# Patient Record
Sex: FEMALE | Race: WHITE | NOT HISPANIC OR LATINO | Employment: OTHER | URBAN - METROPOLITAN AREA
[De-identification: names, ages, dates, MRNs, and addresses within clinical notes are randomized per-mention and may not be internally consistent; named-entity substitution may affect disease eponyms.]

---

## 2017-09-19 ENCOUNTER — ALLSCRIPTS OFFICE VISIT (OUTPATIENT)
Dept: OTHER | Facility: OTHER | Age: 72
End: 2017-09-19

## 2017-09-19 DIAGNOSIS — E03.9 HYPOTHYROIDISM: ICD-10-CM

## 2017-09-19 DIAGNOSIS — E78.5 HYPERLIPIDEMIA: ICD-10-CM

## 2017-09-19 DIAGNOSIS — Z12.31 ENCOUNTER FOR SCREENING MAMMOGRAM FOR MALIGNANT NEOPLASM OF BREAST: ICD-10-CM

## 2017-09-19 DIAGNOSIS — F41.8 OTHER SPECIFIED ANXIETY DISORDERS: ICD-10-CM

## 2017-09-19 DIAGNOSIS — Z78.0 ASYMPTOMATIC MENOPAUSAL STATE: ICD-10-CM

## 2017-10-18 ENCOUNTER — ALLSCRIPTS OFFICE VISIT (OUTPATIENT)
Dept: OTHER | Facility: OTHER | Age: 72
End: 2017-10-18

## 2017-10-21 NOTE — PROGRESS NOTES
Assessment  1  Carpal tunnel syndrome of right wrist (354 0) (G56 01)   2  Trigger ring finger of right hand (727 03) (M65 341)    Plan  Trigger ring finger of right hand    · Follow-up visit in 3 weeks Evaluation and Treatment  Follow-up  Status: Hold For -  Scheduling  Requested for: 61WNC4588   · Continue with our present treatment plan ; Status:Complete;   Done: 75FSG8016    Discussion/Summary    Patient with right hand paresthesias, previously diagnosed with carpal tunnel syndrome  It was explained that since the patient has been treated previously, we would need the records from this to see what all has been performed in the results of the EMG  Regarding the finger, patient diagnosed with a right ring trigger finger  The anatomy and physiology of trigger finger was discussed with the patient today in the office  Edema and increased contact pressure within the flexor tendons at the A1 pulley can cause pain, crepitation, and limitation of function  Treatment options include resting MP blocking splints to decrease edema, oral anti-inflammatory medications, home or formal therapy exercises, up to 2 steroid injections within the tendon sheath, or surgical release  While majority of patients do respond to conservative treatment, up to 20% may require surgical release  this time, patient would like to wait for treatment of the trigger finger until we are able to fully valve evaluate her EMG  It was explained since her symptoms are not completely typical of carpal tunnel, we do need to verify that there is nothing further going on causing her symptoms  Chief Complaint  1  Wrist Pain    History of Present Illness  HPI: Patient is a pleasant 79-year-old female who presents here today with complaints of right hand paresthesias as well as ring finger locking and clicking  Patient states she has had previous treatment for carpal tunnel syndrome at an outside facility   States she has received multiple injections into the carpal tunnel, all of which have helped but do wear off  Believe she had a previous EMG approximately a year ago  Has tried carpal tunnel splints with partial relief  No treatment for her finger  Review of Systems    Constitutional: No fever, no chills, feels well, no tiredness, no recent weight gain or loss  Eyes: No complaints of eyesight problems, no red eyes  ENT: no loss of hearing, no nosebleeds, no sore throat  Cardiovascular: No complaints of chest pain, no palpitations, no leg claudication or lower extremity edema  Respiratory: no compliants of shortness of breath, no wheezing, no cough  Gastrointestinal: no complaints of abdominal pain, no constipation, no nausea or diarrhea, no vomiting, no bloody stools  Genitourinary: no complaints of dysuria, no incontinence  Musculoskeletal: as noted in HPI  Integumentary: no complaints of skin rash or lesion, no itching or dry skin, no skin wounds  Neurological: as noted in HPI  Endocrine: No complaints of muscle weakness, no feelings of weakness, no frequent urination, no excessive thirst    Psychiatric: No suicidal thoughts, no anxiety, no feelings of depression  Active Problems  1  Carpal tunnel syndrome of right wrist (354 0) (G56 01)   2  Chronic neck pain (723 1,338 29) (M54 2,G89 29)   3  Degenerative cervical spinal stenosis (723 0) (M48 02)   4  Depression with anxiety (300 4) (F41 8)   5  Encounter for screening mammogram for breast cancer (V76 12) (Z12 31)   6  Hyperlipidemia (272 4) (E78 5)   7  Hypothyroidism (244 9) (E03 9)   8  Insomnia (780 52) (G47 00)   9  Menopause (627 2) (Z78 0)   10  Need for influenza vaccination (V04 81) (Z23)   11  Obesity (BMI 30 0-34 9) (278 00) (E66 9)   12  Shoulder pain, right (719 41) (M25 511)    Past Medical History    The active problems and past medical history were reviewed and updated today        Surgical History   · History of Cholecystectomy   · History of Gastrectomy Sleeve   · History of Total Knee Replacement Right    The surgical history was reviewed and updated today  Family History   · Family history of lung cancer (V16 1) (Z80 1)   · Family history of depression (V17 0) (Z81 8)   · Family history of Alcohol abuse   · Family history of Patient denies drug use   · Family history of lung cancer (V16 1) (Z80 1)   · Family history of malignant neoplasm of esophagus (V16 0) (Z80 0)    The family history was reviewed and updated today  Social History   · Never smoker   · Occasional alcohol use  The social history was reviewed and updated today  The social history was reviewed and is unchanged  Current Meds   1  Acyclovir 400 MG Oral Tablet; TAKE 1 TABLET DAILY PRN FOR HERPES OUTBREAK; Therapy: 51NVY3226 to Recorded   2  Atorvastatin Calcium 20 MG Oral Tablet; Take 1 tablet daily; Therapy: 42Uum8670 to (Evaluate:90Nsp3418); Last Rx:83Piv9484 Ordered   3  B-12 500 MCG Oral Tablet; TAKE 1 TABLET DAILY; Therapy: 55Qpl3744 to (Evaluate:19Oct2017) Recorded   4  Biotin 5000 MCG Oral Capsule; take  1  caps  daily; Therapy: 00Zyh1279 to Recorded   5  Levothyroxine Sodium 88 MCG Oral Tablet; take 1 tablet by mouth daily; Therapy: 80Rpr3812 to (Evaluate:04Jwq6160); Last Rx:84Edy3804 Ordered   6  Multi-Vitamin Oral Tablet; take 1 tab daily; Therapy: 64Dvs4626 to Recorded   7  TraMADol HCl - 50 MG Oral Tablet; TAKE 1 TABLET BID PRN FOR PAIN;   Therapy: 38Isp7430 to (Last Rx:21Jwe8865) Ordered   8  TraZODone HCl - 50 MG Oral Tablet; take 2 tablets at bedtime; Therapy: 69Hbp5507 to (Evaluate:18Nov2017)  Requested for: 51Hcz3159; Last   Rx:36Qrk3603 Ordered   9  Venlafaxine HCl - 75 MG Oral Tablet; TAKE 2 TABLETS TWICE DAILY; Therapy: 62Ypw1331 to (Evaluate:74Hrv2766)  Requested for: 33CGF1200; Last   Rx:22Fax9586 Ordered   10  Vitamin D3 5000 UNIT Oral Tablet; Take 1 tab  Daily; Therapy: 93Jek2400 to Recorded    The medication list was reviewed and updated today  Allergies  1  Adhesive Tape TAPE   2  Codeine Derivatives   3  Lisinopril TABS   4  Paxil TABS    Vitals  Signs   Heart Rate: 88  Systolic: 317  Diastolic: 80  Height: 5 ft 5 5 in  Weight: 196 lb 6 oz  BMI Calculated: 32 18  BSA Calculated: 1 97    Physical Exam      General: No acute distress, age-appropriate  Neck: Supple, trachea midline  HEENT: Normocephalic atraumatic, mucous membranes are moist, sclera are nonicteric  Cardiovascular: No discernable arrhythmia  Respiratory: Breathing is even and unlabored, no stridor or audible wheezing  Psychiatric: Awake alert and oriented x3, normal mood and affect  Abdomen: Without rebound or guarding  Right Basic: Tinnel's at Carpal Tunnel, CMC Joint Pain and Median Weakness, but No Tinel's at Cubital Tunnel, No DeQuervian's and No Ulnar Weakness (Patient with positive compression test at the level of carpal tunnel  Patient with tenderness to palpation along the A1 pulley of the ring finger  Crepitation is felt with flexion extension of the finger)   Cervical Spine: (The cervical spine has adequate range of motion without any evidence of a Spurling's sign  Patient describes the right hand feeling weird with motion of the spine as well as with tension testing  5/5 strength with shoulder abduction, elbow flexion and extension, along with wrist flexion and extension)       Future Appointments    Date/Time Provider Specialty Site   03/19/2018 04:30 PM JAYLENE Collins  [facility]   10/30/2017 01:15 PM JAYLENE Rosales   Orthopedic Surgery  [address] [facility]     Signatures   Electronically signed by : Shandra De Luna [facility]; Oct 19 2017  8:33AM EST                       (Author)    Electronically signed by : JAYLENE Espinoza ; Oct 20 2017  2:48PM EST

## 2017-10-30 ENCOUNTER — GENERIC CONVERSION - ENCOUNTER (OUTPATIENT)
Dept: OTHER | Facility: OTHER | Age: 72
End: 2017-10-30

## 2018-01-02 ENCOUNTER — HOSPITAL ENCOUNTER (OUTPATIENT)
Facility: HOSPITAL | Age: 73
Setting detail: OUTPATIENT SURGERY
Discharge: HOME | End: 2018-01-02
Attending: ORTHOPAEDIC SURGERY | Admitting: ORTHOPAEDIC SURGERY
Payer: COMMERCIAL

## 2018-01-02 VITALS
HEART RATE: 79 BPM | BODY MASS INDEX: 29.82 KG/M2 | SYSTOLIC BLOOD PRESSURE: 107 MMHG | TEMPERATURE: 98.1 F | RESPIRATION RATE: 16 BRPM | WEIGHT: 190 LBS | HEIGHT: 67 IN | OXYGEN SATURATION: 98 % | DIASTOLIC BLOOD PRESSURE: 56 MMHG

## 2018-01-02 PROBLEM — G56.01 CARPAL TUNNEL SYNDROME ON RIGHT: Status: ACTIVE | Noted: 2018-01-02

## 2018-01-02 PROBLEM — M65.341 TRIGGER RING FINGER OF RIGHT HAND: Status: ACTIVE | Noted: 2018-01-02

## 2018-01-02 RX ORDER — HYDROCODONE BITARTRATE AND ACETAMINOPHEN 5; 325 MG/1; MG/1
1 TABLET ORAL EVERY 6 HOURS PRN
Qty: 10 TABLET | Refills: 0 | Status: SHIPPED | OUTPATIENT
Start: 2018-01-02 | End: 2018-03-19 | Stop reason: ALTCHOICE

## 2018-01-02 RX ORDER — MAGNESIUM HYDROXIDE 1200 MG/15ML
LIQUID ORAL AS NEEDED
Status: DISCONTINUED | OUTPATIENT
Start: 2018-01-02 | End: 2018-01-02 | Stop reason: HOSPADM

## 2018-01-02 RX ADMIN — LIDOCAINE HYDROCHLORIDE,EPINEPHRINE BITARTRATE: 10; .01 INJECTION, SOLUTION INFILTRATION; PERINEURAL at 08:20

## 2018-01-02 NOTE — H&P
H&P Exam - Orthopedics   Uche Jara 67 y o  female MRN: [id number]  Unit/Bed#: [facility] 02    Assessment/Plan   Assessment:  R CTS and R ring trigger finger    Plan:  R ECTR and R ring TFR to be performed today under local anesthesia  History of Present Illness   HPI:  Uche Jara is a 67 y o  y o  female who presents with right hand paresthesias  Previous steroid injection into the carpal tunnel resulted in a few months relief  + nocturnal symptoms  Also with catching/locking of right ring finger  Historical Information  Review Of Systems:   · Skin: Normal  · Neuro: See HPI  · Musculoskeletal: See HPI  · 14 point review of systems negative except as stated above     Past Medical History:   History reviewed  No pertinent past medical history  Past Surgical History:   History reviewed  No pertinent surgical history  Family History:  Family history reviewed and non-contributory  History reviewed  No pertinent family history  Social History:  Social History     Social History    Marital status: /Civil Union     Spouse name: N/A    Number of children: N/A    Years of education: N/A     Social History Main Topics    Smoking status: Never Smoker    Smokeless tobacco: Never Used    Alcohol use Yes      Comment: moderately    Drug use: No    Sexual activity: Not Asked     Other Topics Concern    None     Social History Narrative    None       Allergies: Allergies   Allergen Reactions    Codeine Nausea Only    Other Rash     Adhesive tape           Labs:  No results found for: HCT, HGB, PT, INR, WBC, ESR, CRP    Meds:    Current Facility-Administered Medications:     lidocaine-epinephrine (XYLOCAINE/EPINEPHRINE) 1 %-1:100,000 20 mL, sodium bicarbonate 2 mEq infiltration, , Infiltration, Once, Medina Zamudio MD    Blood Culture:   No results found for: BLOODCX    Wound Culture:   No results found for: WOUNDCULT    Ins and Outs:  No intake/output data recorded              Physical Exam  /60   Pulse 83   Temp 98 4 °F (36 9 °C) (Tympanic Core)   Resp 20   Ht 5' 6 5" (1 689 m)   Wt 86 2 kg (190 lb)   SpO2 98%   BMI 30 21 kg/m²   Gen: Alert and oriented to person, place, time  HEENT: EOMI, eyes clear, moist mucus membranes, hearing intact  Respiratory: Bilateral chest rise   No audible wheezing found  Cardiovascular: Regular Rate and Rhythm  Abdomen: soft nontender/nondistended  Ortho Exam: +A1 pulley nodule of right ring finger with clicking/catching  Neuro Exam: + Tinels, + compression testing, + APB weakness    Lab Results: EMG shows b/l moderate carpal tunnel syndrome

## 2018-01-02 NOTE — OP NOTE
OPERATIVE REPORT  PATIENT NAME: Maurice Heredia  :  1945  MRN: [id number]  Pt Location: [facility]    SURGERY DATE: 18    Surgeon(s) and Role:     * Igor Dumas MD - Primary     * Liliam Lai MD - Assisting    Pre-Op Diagnosis:  Trigger ring finger of right hand [M65 341]  Carpal tunnel syndrome of right wrist [G56 01]    Post-Op Diagnosis Codes:     * Trigger ring finger of right hand [M65 341]     * Carpal tunnel syndrome of right wrist [G56 01]    Procedure(s):  RELEASE CARPAL TUNNEL ENDOSCOPIC (Right)  RING TRIGGER FINGER RELEASE (Right)    Specimen(s):  * No order type specified *    Estimated Blood Loss:   Minimal      Anesthesia Type:   Local    Operative Indications: The patient has a history of Carpal Tunnel Syndrome and Trigger Finger that was recalcitrant to conservative management  The decision was made to bring the patient to the operating room for Endoscopic Carpal Tunnel Release and Trigger Finger Release  Risks of the procedure were explained which include, but are not limited to bleeding; infection; damage to nerves, arteries,veins, tendons; scar; pain; need for reoperation; failure to give desired result; and risks of anaesthesia  All questions were answered to satisfaction and they were willing to proceed  Operative Findings:  Right ring trigger finger and carpal tunnel    Complications:   None    Procedure and Technique:  After the patient, site, and procedure were identified, the patient was brought into the operating room in a supine position  Local anaesthesia was adminstered in the preoperative holding area  A tourniquet was not used  The  right upper extremity was then prepped and drapped in a normal, sterile, orthopedic fashion  After reconfirmation of the patient, site, and surgical procedure, which was agreed upon by the entire surgical team, attention was turned to the right wrist   The sites of the proximal and distal incisions were marked    The susan of the proximal incision was placed horizontally at the midline of the wrist   The distal incision susan was longitudinal extending distally from the point of intersection of the line between the long finger and ring finger and the line along the distal border of the fully abducted thumb  The proximal incision was performed  Subcutaneous tissues were dissected  Then the transverse volar antebrachial fascia was perforated with a scalpel  The edges of the skin incision where retracted and the forearm fascia was incised for approximately 1 5 cm proximally with care taken to identify and protect the median nerve  Retractors were used to inspect the transverse carpal ligament distally  A curved Randhawa dissector was used to glide under the transverse carpal ligament and superficial to the median nerve with confirmation via the washboard feeling  Then the curved Randhawa was pushed into the palm toward the distal incision site  When the location of the distal skin susan was adequate, the distal incision was made  Then with retraction of the skin, further dissection and perforation of the palmar fascia was performed with the use of tenotomy scissors  The curved Randhawa was guided from proximal to distal out the distal incisions without any twisting to allow for dilation of the tract  The curved Randhawa was removed, and the cannula for the camera was inserted along the same tract, making sure to keep the alignment post on the cannula perpendicular to the plane of the hand without twisting  Then while keeping the wrist in extension, and holding the cannula of the camera in place, the wrist was placed on the hyperextension board  The scope was inserted distally, and a cotton-tip applicator was used proximally to clean the tract as well as the scope  A curved cutting knife was introduced from proximal to distal while keeping visualization with the use of the camera    Without twisting of the canula, the knife was used to cut the transverse carpal ligament completely, making sure there were no remnant fibers  Then after this was accomplished, the hand was removed from the extension block  Three maneuvers were used to confirm the full release of the transverse carpal ligament  First, the ease of twisting the trocar of the camera confirmed the release of the ligament  Second, the curved Randhawa was introduced to make sure there were no remnant fibers that could be felt palmarly  Third, the scope was introduced again to visualize that the whole ligament was released proximally to distally  Additional confirmation of full release included retraction and inspection in the distal and proximal incisions to make sure there were no remnant fibers distally or proximally respectively  After the patient, site, and procedure were once again identified, attention was turned to the right ring finger  An incision was made over the flexor tendon sheath at the level of the A1 pulley  Dissection was carried out in-line with the flexor tendon sheath and the radial and ulnar digital artery and nerve were protected  The A1 pulley was identified at the base of the incision  Under direct visualization, the A1 pulley was divided along the midline in its entirety with care taken to avoid injury to the underlying tendon  The tendons were examined to ensure that no further catching, popping, clicking or locking occurred with motion of the finger  At the completion of the procedure, hemostasis was obtained with cautery and direct pressure  The wounds were copiously irrigated with sterile solution  The wounds were closed with Prolene  Sterile dressings were applied, including Xeroform, gauze, tweeners, webril, ACE  Please note, all sponge, needle, and instrument counts were correct prior to closure  Loupe magnification was utilized  The patient tolerated the procedure well       I was present for the entire procedure    Patient Disposition:  APU and hemodynamically stable    SIGNATURE: Sandra Zambrano MD  DATE: 01/02/18  TIME: 9:11 AM

## 2018-01-13 NOTE — CONSULTS
Chief Complaint    1  Wrist Pain    History of Present Illness  HPI: Patient is a pleasant 72-year-old female who presents here today with complaints of right hand paresthesias as well as ring finger locking and clicking  Patient states she has had previous treatment for carpal tunnel syndrome at an outside facility  States she has received multiple injections into the carpal tunnel, all of which have helped but do wear off  Believe she had a previous EMG approximately a year ago  Has tried carpal tunnel splints with partial relief  No treatment for her finger  Review of Systems  Focused-Female Orthopedics:   Constitutional: No fever, no chills, feels well, no tiredness, no recent weight gain or loss  Eyes: No complaints of eyesight problems, no red eyes  ENT: no loss of hearing, no nosebleeds, no sore throat  Cardiovascular: No complaints of chest pain, no palpitations, no leg claudication or lower extremity edema  Respiratory: no compliants of shortness of breath, no wheezing, no cough  Gastrointestinal: no complaints of abdominal pain, no constipation, no nausea or diarrhea, no vomiting, no bloody stools  Genitourinary: no complaints of dysuria, no incontinence  Musculoskeletal: as noted in HPI  Integumentary: no complaints of skin rash or lesion, no itching or dry skin, no skin wounds  Neurological: as noted in HPI  Endocrine: No complaints of muscle weakness, no feelings of weakness, no frequent urination, no excessive thirst    Psychiatric: No suicidal thoughts, no anxiety, no feelings of depression  Active Problems    1  Carpal tunnel syndrome of right wrist (354 0) (G56 01)   2  Chronic neck pain (723 1,338 29) (M54 2,G89 29)   3  Degenerative cervical spinal stenosis (723 0) (M48 02)   4  Depression with anxiety (300 4) (F41 8)   5  Encounter for screening mammogram for breast cancer (V76 12) (Z12 31)   6  Hyperlipidemia (272 4) (E78 5)   7   Hypothyroidism (244 9) (E03 9) 8  Insomnia (780 52) (G47 00)   9  Menopause (627 2) (Z78 0)   10  Need for influenza vaccination (V04 81) (Z23)   11  Obesity (BMI 30 0-34 9) (278 00) (E66 9)   12  Shoulder pain, right (719 41) (M25 511)    Past Medical History  Active Problems And Past Medical History Reviewed: The active problems and past medical history were reviewed and updated today  Surgical History    1  History of Cholecystectomy   2  History of Gastrectomy Sleeve   3  History of Total Knee Replacement Right  Surgical History Reviewed: The surgical history was reviewed and updated today  Family History    1  Family history of lung cancer (V16 1) (Z80 1)    2  Family history of depression (V17 0) (Z81 8)    3  Family history of Alcohol abuse   4  Family history of Patient denies drug use    5  Family history of lung cancer (V16 1) (Z80 1)   6  Family history of malignant neoplasm of esophagus (V16 0) (Z80 0)  Family History Reviewed: The family history was reviewed and updated today  Social History    · Never smoker   · Occasional alcohol use  Social History Reviewed: The social history was reviewed and updated today  The social history was reviewed and is unchanged  Current Meds   1  Acyclovir 400 MG Oral Tablet; TAKE 1 TABLET DAILY PRN FOR HERPES OUTBREAK; Therapy: 95PAR7519 to Recorded   2  Atorvastatin Calcium 20 MG Oral Tablet; Take 1 tablet daily; Therapy: 92Ulx1568 to (Evaluate:71Mcs3909); Last Rx:03Rza8355 Ordered   3  B-12 500 MCG Oral Tablet; TAKE 1 TABLET DAILY; Therapy: 62Rtg2101 to (Evaluate:05Cbi4048) Recorded   4  Biotin 5000 MCG Oral Capsule; take  1  caps  daily; Therapy: 32Kyq5301 to Recorded   5  Levothyroxine Sodium 88 MCG Oral Tablet; take 1 tablet by mouth daily; Therapy: 92Jvq0468 to (Evaluate:12Xbc7750); Last Rx:32Wtz5439 Ordered   6  Multi-Vitamin Oral Tablet; take 1 tab daily; Therapy: 07Duw8418 to Recorded   7   TraMADol HCl - 50 MG Oral Tablet; TAKE 1 TABLET BID PRN FOR PAIN;   Therapy: 79Dqd3462 to (Last Rx:19Sep2017) Ordered   8  TraZODone HCl - 50 MG Oral Tablet; take 2 tablets at bedtime; Therapy: 20Tci8122 to (Evaluate:00Ajq4330)  Requested for: 45Zvy3215; Last   Rx:03Ryy0333 Ordered   9  Venlafaxine HCl - 75 MG Oral Tablet; TAKE 2 TABLETS TWICE DAILY; Therapy: 35Ffw0064 to (Evaluate:94Mlc4431)  Requested for: 97LEQ7856; Last   Rx:84Vyi7254 Ordered   10  Vitamin D3 5000 UNIT Oral Tablet; Take 1 tab  Daily; Therapy: 64Nvn7952 to Recorded  Medication List Reviewed: The medication list was reviewed and updated today  Allergies    1  Adhesive Tape TAPE   2  Codeine Derivatives   3  Lisinopril TABS   4  Paxil TABS    Vitals  Signs   Recorded: 68VFG1459 01:11PM   Heart Rate: 88  Systolic: 308  Diastolic: 80  Height: 5 ft 5 5 in  Weight: 196 lb 6 oz  BMI Calculated: 32 18  BSA Calculated: 1 97    Physical Exam      General: No acute distress, age-appropriate  Neck: Supple, trachea midline  HEENT: Normocephalic atraumatic, mucous membranes are moist, sclera are nonicteric  Cardiovascular: No discernable arrhythmia  Respiratory: Breathing is even and unlabored, no stridor or audible wheezing  Psychiatric: Awake alert and oriented x3, normal mood and affect  Abdomen: Without rebound or guarding  Right Basic: Tinnel's at Carpal Tunnel, CMC Joint Pain and Median Weakness, but No Tinel's at Cubital Tunnel, No DeQuervian's and No Ulnar Weakness (Patient with positive compression test at the level of carpal tunnel  Patient with tenderness to palpation along the A1 pulley of the ring finger  Crepitation is felt with flexion extension of the finger)   Cervical Spine: (The cervical spine has adequate range of motion without any evidence of a Spurling's sign  Patient describes the right hand "feeling weird" with motion of the spine as well as with tension testing   5/5 strength with shoulder abduction, elbow flexion and extension, along with wrist flexion and extension)       Assessment    1  Carpal tunnel syndrome of right wrist (354 0) (G56 01)   2  Trigger ring finger of right hand (727 03) (M65 341)    Plan  Trigger ring finger of right hand    1  Follow-up visit in 3 weeks Evaluation and Treatment  Follow-up  Status: Hold For -   Scheduling  Requested for: 39ZIB6184   2  Continue with our present treatment plan ; Status:Complete;   Done: 56FEP3491    Discussion/Summary  Discussion Summary:   Patient with right hand paresthesias, previously diagnosed with carpal tunnel syndrome  It was explained that since the patient has been treated previously, we would need the records from this to see what all has been performed in the results of the EMG  Regarding the finger, patient diagnosed with a right ring trigger finger  The anatomy and physiology of trigger finger was discussed with the patient today in the office  Edema and increased contact pressure within the flexor tendons at the A1 pulley can cause pain, crepitation, and limitation of function  Treatment options include resting MP blocking splints to decrease edema, oral anti-inflammatory medications, home or formal therapy exercises, up to 2 steroid injections within the tendon sheath, or surgical release  While majority of patients do respond to conservative treatment, up to 20% may require surgical release  At this time, patient would like to wait for treatment of the trigger finger until we are able to fully valve evaluate her EMG  It was explained since her symptoms are not completely typical of carpal tunnel, we do need to verify that there is nothing further going on causing her symptoms        Future Appointments    Signatures   Electronically signed by : Gui Schulz, [facility]; Oct 19 2017  8:33AM EST                       (Author)    Electronically signed by : JAYLENE Nguyen ; Oct 20 2017  2:48PM EST

## 2018-01-14 VITALS
SYSTOLIC BLOOD PRESSURE: 162 MMHG | HEART RATE: 88 BPM | BODY MASS INDEX: 31.56 KG/M2 | HEIGHT: 66 IN | WEIGHT: 196.38 LBS | DIASTOLIC BLOOD PRESSURE: 80 MMHG

## 2018-01-15 VITALS
DIASTOLIC BLOOD PRESSURE: 62 MMHG | TEMPERATURE: 97.8 F | HEART RATE: 72 BPM | SYSTOLIC BLOOD PRESSURE: 118 MMHG | HEIGHT: 66 IN | RESPIRATION RATE: 16 BRPM | WEIGHT: 203.4 LBS | BODY MASS INDEX: 32.69 KG/M2

## 2018-01-15 NOTE — CONSULTS
Chief Complaint    1  Wrist Pain    History of Present Illness  HPI: Patient is a pleasant 70-year-old female who presents here today with complaints of right hand paresthesias as well as ring finger locking and clicking  Patient states she has had previous treatment for carpal tunnel syndrome at an outside facility  States she has received multiple injections into the carpal tunnel, all of which have helped but do wear off  Believe she had a previous EMG approximately a year ago  Has tried carpal tunnel splints with partial relief  No treatment for her finger  Review of Systems    Constitutional: No fever, no chills, feels well, no tiredness, no recent weight gain or loss  Eyes: No complaints of eyesight problems, no red eyes  ENT: no loss of hearing, no nosebleeds, no sore throat  Cardiovascular: No complaints of chest pain, no palpitations, no leg claudication or lower extremity edema  Respiratory: no compliants of shortness of breath, no wheezing, no cough  Gastrointestinal: no complaints of abdominal pain, no constipation, no nausea or diarrhea, no vomiting, no bloody stools  Genitourinary: no complaints of dysuria, no incontinence  Musculoskeletal: as noted in HPI  Integumentary: no complaints of skin rash or lesion, no itching or dry skin, no skin wounds  Neurological: as noted in HPI  Endocrine: No complaints of muscle weakness, no feelings of weakness, no frequent urination, no excessive thirst    Psychiatric: No suicidal thoughts, no anxiety, no feelings of depression  Active Problems    1  Carpal tunnel syndrome of right wrist (354 0) (G56 01)   2  Chronic neck pain (723 1,338 29) (M54 2,G89 29)   3  Degenerative cervical spinal stenosis (723 0) (M48 02)   4  Depression with anxiety (300 4) (F41 8)   5  Encounter for screening mammogram for breast cancer (V76 12) (Z12 31)   6  Hyperlipidemia (272 4) (E78 5)   7  Hypothyroidism (244 9) (E03 9)   8   Insomnia (780 52) (G47 00)   9  Menopause (627 2) (Z78 0)   10  Need for influenza vaccination (V04 81) (Z23)   11  Obesity (BMI 30 0-34 9) (278 00) (E66 9)   12  Shoulder pain, right (719 41) (M25 511)    Past Medical History    The active problems and past medical history were reviewed and updated today  Surgical History    · History of Cholecystectomy   · History of Gastrectomy Sleeve   · History of Total Knee Replacement Right    The surgical history was reviewed and updated today  Family History    · Family history of lung cancer (V16 1) (Z80 1)    · Family history of depression (V17 0) (Z81 8)    · Family history of Alcohol abuse   · Family history of Patient denies drug use    · Family history of lung cancer (V16 1) (Z80 1)   · Family history of malignant neoplasm of esophagus (V16 0) (Z80 0)    The family history was reviewed and updated today  Social History    · Never smoker   · Occasional alcohol use  The social history was reviewed and updated today  The social history was reviewed and is unchanged  Current Meds   1  Acyclovir 400 MG Oral Tablet; TAKE 1 TABLET DAILY PRN FOR HERPES OUTBREAK; Therapy: 32ETZ7727 to Recorded   2  Atorvastatin Calcium 20 MG Oral Tablet; Take 1 tablet daily; Therapy: 93Cgm1400 to (Evaluate:14Sep2018); Last Rx:01Ktu1701 Ordered   3  B-12 500 MCG Oral Tablet; TAKE 1 TABLET DAILY; Therapy: 19Sep2017 to (Evaluate:19Oct2017) Recorded   4  Biotin 5000 MCG Oral Capsule; take  1  caps  daily; Therapy: 32Gxq2242 to Recorded   5  Levothyroxine Sodium 88 MCG Oral Tablet; take 1 tablet by mouth daily; Therapy: 40Yzm8552 to (Evaluate:30Yan1441); Last Rx:24Ipv3774 Ordered   6  Multi-Vitamin Oral Tablet; take 1 tab daily; Therapy: 68Rim2346 to Recorded   7  TraMADol HCl - 50 MG Oral Tablet; TAKE 1 TABLET BID PRN FOR PAIN;   Therapy: 09Gtc5619 to (Last Rx:27Rsk2620) Ordered   8  TraZODone HCl - 50 MG Oral Tablet; take 2 tablets at bedtime;    Therapy: 37Ump7509 to (Evaluate:18Nov2017)  Requested for: 43Hgc1881; Last   Rx:03Hiz6846 Ordered   9  Venlafaxine HCl - 75 MG Oral Tablet; TAKE 2 TABLETS TWICE DAILY; Therapy: 69Vrg5903 to (Evaluate:67Blv5161)  Requested for: 57JEE7856; Last   Rx:57Opu0606 Ordered   10  Vitamin D3 5000 UNIT Oral Tablet; Take 1 tab  Daily; Therapy: [address]2356 to Recorded    The medication list was reviewed and updated today  Allergies    1  Adhesive Tape TAPE   2  Codeine Derivatives   3  Lisinopril TABS   4  Paxil TABS    Vitals  Signs    Heart Rate: 04ZWGJSDYY: 303WRDKZYZUD: 75EMABNZ: 5 ft 5 5 inWeight: 196 lb 6 ozBMI Calculated: 32 18BSA Calculated: 1 97    Physical Exam      General: No acute distress, age-appropriate  Neck: Supple, trachea midline  HEENT: Normocephalic atraumatic, mucous membranes are moist, sclera are nonicteric  Cardiovascular: No discernable arrhythmia  Respiratory: Breathing is even and unlabored, no stridor or audible wheezing  Psychiatric: Awake alert and oriented x3, normal mood and affect  Abdomen: Without rebound or guarding  Right Basic: Tinnel's at Carpal Tunnel, CMC Joint Pain and Median Weakness, but No Tinel's at Cubital Tunnel, No DeQuervian's and No Ulnar Weakness (Patient with positive compression test at the level of carpal tunnel  Patient with tenderness to palpation along the A1 pulley of the ring finger  Crepitation is felt with flexion extension of the finger)   Cervical Spine: (The cervical spine has adequate range of motion without any evidence of a Spurling's sign  Patient describes the right hand "feeling weird" with motion of the spine as well as with tension testing  5/5 strength with shoulder abduction, elbow flexion and extension, along with wrist flexion and extension)       Assessment    1  Carpal tunnel syndrome of right wrist (354 0) (G56 01)   2   Trigger ring finger of right hand (727 03) (M65 341)    Plan     Trigger ring finger of right hand    · Follow-up visit in 3 weeks Evaluation and Treatment  Follow-up  Status: Hold For -  Scheduling  Requested for: 64PDG1740   · Continue with our present treatment plan ; Status:Complete;   Done: 21IOJ7912    Discussion/Summary    Patient with right hand paresthesias, previously diagnosed with carpal tunnel syndrome  It was explained that since the patient has been treated previously, we would need the records from this to see what all has been performed in the results of the EMG  Regarding the finger, patient diagnosed with a right ring trigger finger  The anatomy and physiology of trigger finger was discussed with the patient today in the office  Edema and increased contact pressure within the flexor tendons at the A1 pulley can cause pain, crepitation, and limitation of function  Treatment options include resting MP blocking splints to decrease edema, oral anti-inflammatory medications, home or formal therapy exercises, up to 2 steroid injections within the tendon sheath, or surgical release  While majority of patients do respond to conservative treatment, up to 20% may require surgical release  At this time, patient would like to wait for treatment of the trigger finger until we are able to fully valve evaluate her EMG  It was explained since her symptoms are not completely typical of carpal tunnel, we do need to verify that there is nothing further going on causing her symptoms        Signatures   Electronically signed by : Britton Muir, [facility]; Oct 19 2017  8:33AM EST                       (Author)    Electronically signed by : JAYLENE Dumont ; Oct 20 2017  2:48PM EST

## 2018-01-19 ENCOUNTER — ALLSCRIPTS OFFICE VISIT (OUTPATIENT)
Dept: OTHER | Facility: OTHER | Age: 73
End: 2018-01-19

## 2018-01-19 ENCOUNTER — HOSPITAL ENCOUNTER (EMERGENCY)
Facility: HOSPITAL | Age: 73
Discharge: HOME | End: 2018-01-19
Attending: EMERGENCY MEDICINE
Payer: COMMERCIAL

## 2018-01-19 ENCOUNTER — APPOINTMENT (EMERGENCY)
Dept: RADIOLOGY | Facility: HOSPITAL | Age: 73
End: 2018-01-19
Payer: COMMERCIAL

## 2018-01-19 VITALS
SYSTOLIC BLOOD PRESSURE: 169 MMHG | HEART RATE: 76 BPM | RESPIRATION RATE: 18 BRPM | OXYGEN SATURATION: 98 % | BODY MASS INDEX: 31 KG/M2 | TEMPERATURE: 98 F | WEIGHT: 195 LBS | DIASTOLIC BLOOD PRESSURE: 69 MMHG
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
## 2018-01-19 DIAGNOSIS — S52.614A CLOSED NONDISPLACED FRACTURE OF STYLOID PROCESS OF RIGHT ULNA, INITIAL ENCOUNTER: Primary | ICD-10-CM

## 2018-01-19 DIAGNOSIS — R07.81 RIB PAIN ON RIGHT SIDE: ICD-10-CM

## 2018-01-19 DIAGNOSIS — M25.531 RIGHT WRIST PAIN: ICD-10-CM

## 2018-01-19 DIAGNOSIS — S60.511A ABRASION OF RIGHT HAND, INITIAL ENCOUNTER: ICD-10-CM

## 2018-01-19 PROCEDURE — 90471 IMMUNIZATION ADMIN: CPT

## 2018-01-19 PROCEDURE — 99283 EMERGENCY DEPT VISIT LOW MDM: CPT

## 2018-01-19 PROCEDURE — 73110 X-RAY EXAM OF WRIST: CPT

## 2018-01-19 PROCEDURE — 71101 X-RAY EXAM UNILAT RIBS/CHEST: CPT

## 2018-01-19 PROCEDURE — 96372 THER/PROPH/DIAG INJ SC/IM: CPT

## 2018-01-19 PROCEDURE — 90715 TDAP VACCINE 7 YRS/> IM: CPT | Performed by: EMERGENCY MEDICINE

## 2018-01-19 RX ORDER — KETOROLAC TROMETHAMINE 30 MG/ML
15 INJECTION, SOLUTION INTRAMUSCULAR; INTRAVENOUS ONCE
Status: COMPLETED | OUTPATIENT
Start: 2018-01-19 | End: 2018-01-19

## 2018-01-19 RX ADMIN — KETOROLAC TROMETHAMINE 15 MG: 30 INJECTION, SOLUTION INTRAMUSCULAR at 16:12

## 2018-01-19 RX ADMIN — TETANUS TOXOID, REDUCED DIPHTHERIA TOXOID AND ACELLULAR PERTUSSIS VACCINE, ADSORBED 0.5 ML: 5; 2.5; 8; 8; 2.5 SUSPENSION INTRAMUSCULAR at 16:11

## 2018-01-19 NOTE — ED PROVIDER NOTES
History  Chief Complaint   Patient presents with    Hand Injury     Reports to have slipped on ice today; denies LOC  Reports R hand pain and R rib pain  Patient is a 77-year-old, right hand dominant female who presents with right hand and right rib pain status post fall prior to arrival   Patient reports that she was trying to walk out of [employer] where she slipped or tripped on the 2 steps resulting in falling onto her right outstretched hand as well as striking the right side of her ribs and right hip and leg  Denies striking her head or loss of consciousness  Complains of pain to the right wrist and palm as well as pain to the right rib cage that is a 7 out of 10  Patient reports that she recently had carpal tunnel surgery and trigger finger on the right hand and had the stitches removed earlier today prior to the fall- called Dr Robert Griffiths office who said to go to the ED to make sure that she did not break the wrist s/p fall  Denies CP, SOB, numbness, tingling, neck pain, back pain, loss of bowel or bladder control, ha, vision changes  Assessment and Plan: s/p fall from 2 stairs  XR right wrist and XR right rib series to rule out acute fractures/ PTX  Update tetanus  Pain control  Reassess  Prior to Admission Medications   Prescriptions Last Dose Informant Patient Reported? Taking? HYDROcodone-acetaminophen (NORCO) 5-325 mg per tablet   No No   Sig: Take 1 tablet by mouth every 6 (six) hours as needed for pain for up to 10 doses Max Daily Amount: 4 tablets      Facility-Administered Medications: None       History reviewed  No pertinent past medical history      Past Surgical History:   Procedure Laterality Date    WI INCISE FINGER TENDON SHEATH Right 1/2/2018    Procedure: RING TRIGGER FINGER RELEASE;  Surgeon: Lovely Polanco MD;  Location: [facility];  Service: Orthopedics    WI WRIST Georgenie Footman LIG Right 1/2/2018    Procedure: RELEASE CARPAL TUNNEL ENDOSCOPIC; Surgeon: Leopold Mitts, MD;  Location: [facility];  Service: Orthopedics       History reviewed  No pertinent family history  I have reviewed and agree with the history as documented  Social History   Substance Use Topics    Smoking status: Never Smoker    Smokeless tobacco: Never Used    Alcohol use Yes      Comment: moderately        Review of Systems   Constitutional: Negative for chills and fever  HENT: Negative for congestion, facial swelling and tinnitus  Eyes: Negative for photophobia and visual disturbance  Respiratory: Negative for chest tightness and shortness of breath  Cardiovascular: Positive for chest pain (right ribcage pain)  Negative for palpitations and leg swelling  Gastrointestinal: Negative for abdominal pain, constipation, diarrhea, nausea and vomiting  Genitourinary: Negative for dysuria and hematuria  Musculoskeletal: Negative for back pain, neck pain and neck stiffness  Skin: Positive for wound  Negative for pallor and rash  Neurological: Negative for dizziness, weakness, light-headedness, numbness and headaches  Physical Exam  ED Triage Vitals [01/19/18 1519]   Temperature Pulse Respirations Blood Pressure SpO2   98 °F (36 7 °C) 76 18 169/69 98 %      Temp Source Heart Rate Source Patient Position - Orthostatic VS BP Location FiO2 (%)   Oral -- -- -- --      Pain Score       7           Orthostatic Vital Signs  Vitals:    01/19/18 1519   BP: 169/69   Pulse: 76       Physical Exam   Constitutional: She is oriented to person, place, and time  She appears well-developed and well-nourished  No distress  HENT:   Head: Normocephalic and atraumatic  Right Ear: External ear normal    Left Ear: External ear normal    Nose: Nose normal    Mouth/Throat: Oropharynx is clear and moist  No oropharyngeal exudate  Eyes: Conjunctivae and EOM are normal  Pupils are equal, round, and reactive to light  Neck: Normal range of motion  Neck supple   No tracheal deviation present  Cardiovascular: Normal rate, regular rhythm, normal heart sounds and intact distal pulses  Exam reveals no gallop and no friction rub  No murmur heard  Pulmonary/Chest: Effort normal and breath sounds normal  No respiratory distress  She has no wheezes  She has no rales  She exhibits no tenderness  Abdominal: Soft  Bowel sounds are normal  She exhibits no distension  There is no tenderness  There is no guarding  Musculoskeletal: Normal range of motion  She exhibits no edema  Right wrist: She exhibits normal range of motion, no tenderness, no bony tenderness, no swelling, no effusion, no crepitus, no deformity and no laceration  Right hip: Normal  She exhibits normal range of motion, normal strength, no tenderness, no bony tenderness and no swelling  Left hip: Normal  She exhibits normal range of motion, normal strength, no tenderness and no bony tenderness  Cervical back: Normal  She exhibits normal range of motion, no tenderness, no bony tenderness, no swelling, no edema, no deformity, no laceration, no pain, no spasm and normal pulse  Thoracic back: Normal  She exhibits normal range of motion, no tenderness, no bony tenderness, no swelling, no edema, no deformity, no laceration, no pain, no spasm and normal pulse  Lumbar back: Normal  She exhibits normal range of motion, no tenderness, no bony tenderness, no swelling, no edema, no deformity, no laceration, no pain, no spasm and normal pulse  Right hand: She exhibits tenderness and swelling  She exhibits normal range of motion, no bony tenderness, normal two-point discrimination, normal capillary refill, no deformity and no laceration  Normal sensation noted  Normal strength noted  Hands:  Neurological: She is alert and oriented to person, place, and time  She has normal strength and normal reflexes  No cranial nerve deficit or sensory deficit  She exhibits normal muscle tone  Coordination and gait normal  GCS eye subscore is 4  GCS verbal subscore is 5  GCS motor subscore is 6  Skin: Skin is warm and dry  Capillary refill takes less than 2 seconds  No rash noted  She is not diaphoretic  No erythema  No pallor  Psychiatric:   Tearful and upset   Nursing note and vitals reviewed  ED Medications  Medications   tetanus-diphtheria-acellular pertussis (BOOSTRIX) IM injection 0 5 mL (0 5 mL Intramuscular Given 1/19/18 1611)   ketorolac (TORADOL) injection 15 mg (15 mg Intramuscular Given 1/19/18 1612)       Diagnostic Studies  Results Reviewed     None                 XR ribs with pa chest min 3 views RIGHT   Final Result by Dianna Gordon MD (01/19 1621)      1  No active pulmonary disease  2  No evidence of rib fractures  Workstation performed: [id number]         XR wrist 3+ views RIGHT   Final Result by Dianna Gordon MD (01/19 1619)      Suspected hairline fracture of ulnar styloid  No other injuries are seen         Workstation performed: [id number]               Procedures  Procedures      Phone Consults  ED Phone Contact    ED Course  ED Course as of Jan 19 1730 Fri Jan 19, 2018   1720 Patient evaluated s/p ulnar gutter splint application by ed tech  Sensation intact, able to move all fingers, cap refill < 2 secs, fingers are warm and well perfused  Discussed followup instructions with patient as well as instructed to use incentive spirometet at home to encourage deep breathing even though patient does not have a rib fracture visualized on XR, but clinically having pain over right ribcage  Patient verbalized understanding of instructions and has no further questions at this time                                   MDM  Number of Diagnoses or Management Options  Abrasion of right hand, initial encounter:   Closed nondisplaced fracture of styloid process of right ulna, initial encounter:   Fall down stairs, initial encounter:   Rib pain on right side: Right wrist pain:   Diagnosis management comments: Right ulnar styloid fracture and clinical right rib fracture/pain  Will treat with ulnar gutter splint for right wrist and follow up with Dr Josh Moreno in 1 week  Patient has pain medications remaining from surgery which she will take as needed in addition to tylenol and motrin  Rib pain- encouraged incentive spirometry  Discussed return precautions with patient and she verbalized understanding  CritCare Time    Disposition  Final diagnoses:   Fall down stairs, initial encounter   Abrasion of right hand, initial encounter   Right wrist pain   Rib pain on right side   Closed nondisplaced fracture of styloid process of right ulna, initial encounter     Time reflects when diagnosis was documented in both MDM as applicable and the Disposition within this note     Time User Action Codes Description Comment    1/19/2018  4:20 PM Estrella Jacobsen Fall down stairs, initial encounter     1/19/2018  4:20 PM Rubenanda Guise Add [S60 511A] Abrasion of right hand, initial encounter     1/19/2018  4:20 PM Lewanda Guise Add [M25 531] Right wrist pain     1/19/2018  4:20 PM Lewanda Guise Add [R07 81] Rib pain on right side     1/19/2018  4:30 PM Lewanda Guise Add [U65 827F] Closed nondisplaced fracture of styloid process of right ulna, initial encounter     1/19/2018  4:31 PM Yana Durham Fall down stairs, initial encounter     1/19/2018  4:31 PM Kelly Guyinka Modify [O33 129K] Closed nondisplaced fracture of styloid process of right ulna, initial encounter       ED Disposition     ED Disposition Condition Comment    Discharge  Eloise Charline discharge to home/self care      Condition at discharge: Good        Follow-up Information     Follow up With Specialties Details Why Contact Info Additional Information    Myles Jorge MD Orthopedic Surgery Schedule an appointment as soon as possible for a visit in 1 week for re-evaluation [address]       Paul Jean MD Family Medicine Schedule an appointment as soon as possible for a visit in 1 week for re-evaluation [address] [facility]  [phone]       [facility] Emergency Department Emergency Medicine Go to for re-evaluation, As needed, If symptoms worsen [address] [facility]  [phone] [facility] ED, [address] [facility] [place], [place], [address]        Discharge Medication List as of 1/19/2018  5:20 PM      CONTINUE these medications which have NOT CHANGED    Details   HYDROcodone-acetaminophen (NORCO) 5-325 mg per tablet Take 1 tablet by mouth every 6 (six) hours as needed for pain for up to 10 doses Max Daily Amount: 4 tablets, Starting Tue 1/2/2018, Print           No discharge procedures on file  ED Provider  Attending physically available and evaluated Eloise Olivier I managed the patient along with the ED Attending      Electronically Signed by         Danielle Nash DO  01/19/18 1625

## 2018-01-19 NOTE — DISCHARGE INSTRUCTIONS
Arm Fracture in Adults   WHAT YOU NEED TO KNOW:   An arm fracture is a crack or break in one or more of the bones in your arm  An arm fracture may be caused by a fall onto an outstretched hand  It may also be caused by trauma from a car accident or a sports injury  Osteoporosis (brittle bones) can increase your risk for a fracture  DISCHARGE INSTRUCTIONS:   Return to the emergency department if:   · The pain in your injured arm does not get better or gets worse, even after you rest and take medicine  · Your injured arm, hand, or fingers feel numb  · Your arm is swollen, red, and feels warm  · Your skin over the arm fracture is swollen, cold, or pale  · You cannot move your arm, hand, or fingers  Contact your healthcare provider if:   · You have a fever  · Your brace or splint becomes wet, damaged, or comes off  · You have questions or concerns about your injury, treatment, or care  Medicines:   · NSAIDs , such as ibuprofen, help decrease swelling and pain  This medicine is available with or without a doctor's order  NSAIDs can cause stomach bleeding or kidney problems in certain people  If you take blood thinner medicine, always ask your healthcare provider if NSAIDs are safe for you  Always read the medicine label and follow directions  · Acetaminophen  decreases pain  It is available without a doctor's order  Ask how much to take and how often to take it  Follow directions  Acetaminophen can cause liver damage if not taken correctly  · Prescription pain medicine  may be given  Ask how to take this medicine safely  · Take your medicine as directed  Contact your healthcare provider if you think your medicine is not helping or if you have side effects  Tell him or her if you are allergic to any medicine  Keep a list of the medicines, vitamins, and herbs you take  Include the amounts, and when and why you take them  Bring the list or the pill bottles to follow-up visits   Carry your medicine list with you in case of an emergency  Follow up with your healthcare provider within 1 week: You may need to see a bone specialist within 3 to 4 days if you need surgery or further treatment for your arm fracture  Write down your questions so you remember to ask them during your visits  Rest:  You should rest your arm as much as possible  Ask your healthcare provider when you can put pressure or weight on your arm  Also ask when you can return to sports or vigorous exercises  Ice:  Apply ice on your arm for 15 to 20 minutes every hour or as directed  Use an ice pack, or put crushed ice in a plastic bag  Cover it with a towel  Ice helps prevent tissue damage and decreases swelling and pain  Elevate:  Elevate your arm above the level of your heart as often as you can  This will help decrease swelling and pain  Prop your arm on pillows or blankets to keep it elevated comfortably  Care for your cast or splint:  Ask your healthcare provider when it is okay to bathe  Do not get your cast or splint wet  Before you take a bath or shower, cover your cast or splint with a plastic bag  Tape the bag to your skin to help keep water out  Hold your arm away from the water in case the bag leaks  · Check the skin around your cast or splint each day for any redness or open skin  · Do not use a sharp or pointed object to scratch your skin under the cast or splint  Physical therapy:  A physical therapist teaches you exercises to help improve movement and strength, and to decrease pain  © 2017 [employer] Information is for End User's use only and may not be sold, redistributed or otherwise used for commercial purposes  All illustrations and images included in CareNotes® are the copyrighted property of [employer] , [employer]  or Madi Sarah  The above information is an  only  It is not intended as medical advice for individual conditions or treatments   Talk to your doctor, nurse or pharmacist before following any medical regimen to see if it is safe and effective for you  Abrasion   WHAT YOU NEED TO KNOW:   An abrasion is a scrape on your skin  It happens when your skin rubs against a rough surface  Some examples of an abrasion include rug burn, a skinned elbow, or road rash  Abrasions can be many shapes and sizes  The wound may hurt, bleed, bruise, or swell  DISCHARGE INSTRUCTIONS:   Return to the emergency department if:   · The bleeding does not stop after 10 minutes of firm pressure  · You cannot rinse one or more foreign objects out of your wound  · You have red streaks on your skin coming from your wound  Contact your healthcare provider if:   · You have a fever or chills  · Your abrasion is red, warm, swollen, or draining pus  · You have questions or concerns about your condition or care  Care for your abrasion:   · Wash your hands and dry them with a clean towel  · Press a clean cloth against your wound to stop any bleeding  · Rinse your wound with a lot of clean water  Do not use harsh soap, alcohol, or iodine solutions  · Use a clean, wet cloth to remove any objects, such as small pieces of rocks or dirt  · Rub antibiotic ointment on your wound  This may help prevent infection and help your wound heal     · Cover the wound with a non-stick bandage  Change the bandage daily, and if gets wet or dirty  Follow up with your healthcare provider as directed:  Write down your questions so you remember to ask them during your visits  © 2017 [employer] Information is for End User's use only and may not be sold, redistributed or otherwise used for commercial purposes  All illustrations and images included in CareNotes® are the copyrighted property of [employer] , [employer]  or Madi Sarah  The above information is an  only  It is not intended as medical advice for individual conditions or treatments   Talk to your doctor, nurse or pharmacist before following any medical regimen to see if it is safe and effective for you  Wrist Fracture in Adults   WHAT YOU NEED TO KNOW:   A wrist fracture is a break in one or more of the bones in your wrist    DISCHARGE INSTRUCTIONS:   Return to the emergency department if:   · Your pain gets worse or does not get better after you take pain medicine  · Your cast or splint breaks, gets wet, or is damaged  · Your hand or fingers feel numb or cold  · Your hand or fingers turn white or blue  · Your splint or cast feels too tight  · You have more pain or swelling after the cast or splint is put on  Contact your healthcare provider if:   · You have a fever  · There is a foul smell or blood coming from under the cast     · You have questions or concerns about your condition or care  Medicines:   · Prescription pain medicine  may be given  Ask your healthcare provider how to take this medicine safely  Some prescription pain medicines contain acetaminophen  Do not take other medicines that contain acetaminophen without talking to your healthcare provider  Too much acetaminophen may cause liver damage  Prescription pain medicine may cause constipation  Ask your healthcare provider how to prevent or treat constipation  · NSAIDs , such as ibuprofen, help decrease swelling, pain, and fever  NSAIDs can cause stomach bleeding or kidney problems in certain people  If you take blood thinner medicine, always ask your healthcare provider if NSAIDs are safe for you  Always read the medicine label and follow directions  · Acetaminophen  decreases pain and fever  It is available without a doctor's order  Ask how much to take and how often to take it  Follow directions  Read the labels of all other medicines you are using to see if they also contain acetaminophen, or ask your doctor or pharmacist  Acetaminophen can cause liver damage if not taken correctly   Do not use more than 4 grams (4,000 milligrams) total of acetaminophen in one day  · Take your medicine as directed  Contact your healthcare provider if you think your medicine is not helping or if you have side effects  Tell him or her if you are allergic to any medicine  Keep a list of the medicines, vitamins, and herbs you take  Include the amounts, and when and why you take them  Bring the list or the pill bottles to follow-up visits  Carry your medicine list with you in case of an emergency  Self-care:   · Rest  as much as possible  Do not play contact sports until the healthcare provider says it is okay  · Apply ice  on your wrist for 15 to 20 minutes every hour or as directed  Use an ice pack, or put crushed ice in a plastic bag  Cover it with a towel before you place it on your skin  Ice helps prevent tissue damage and decreases swelling and pain  · Elevate  your wrist above the level of your heart as often as possible  This will help decrease swelling and pain  Prop your wrist on pillows or blankets to keep it elevated comfortably  Cast or splint care:   · You may take a bath or shower as directed  Do not let your cast or splint get wet  Before bathing, cover the cast or splint with 2 plastic trash bags  Tape the bags to your skin above the cast or splint to seal out the water  Keep your arm out of the water in case the bag breaks  If a plaster cast gets wet and soft, call your healthcare provider  · Check the skin around the cast or splint every day  You may put lotion on any red or sore areas  · Do not push down or lean on the cast or brace because it may break  · Do not  scratch the skin under the cast by putting a sharp or pointed object inside the cast   Go to physical therapy as directed: You may need physical therapy after your wrist heals and the cast is removed  A physical therapist can teach you exercises to help improve movement and strength and to decrease pain     Follow up with your healthcare provider or bone specialist as directed: You may need to return to have your cast removed  You may also need an x-ray to check how well the bone has healed  Write down your questions so you remember to ask them during your visits  © 2017 [employer] Information is for End User's use only and may not be sold, redistributed or otherwise used for commercial purposes  All illustrations and images included in CareNotes® are the copyrighted property of A D A M [employer]  or Madi Sarah  The above information is an  only  It is not intended as medical advice for individual conditions or treatments  Talk to your doctor, nurse or pharmacist before following any medical regimen to see if it is safe and effective for you  RICE Therapy   WHAT YOU NEED TO KNOW:   RICE therapy is a 4-step process used to reduce swelling and pain from an injury  RICE stands for rest, ice, compression, and elevation  RICE should be done within the first 24 to 48 hours after an injury  DISCHARGE INSTRUCTIONS:   Follow up with your healthcare provider as directed:  Write down your questions so you remember to ask them during your visits  How to use RICE therapy:   · Rest  the injured area so that it can heal  You may need to avoid putting any weight on your injury for at least 48 hours  Return to normal activities as directed  · Ice  the injury for 20 minutes every 4 hours, or as directed  Use an ice pack, or put crushed ice in a plastic bag  Cover it with a towel to protect your skin  Ice helps prevent tissue damage and decreases swelling and pain  · Compress  the injury with an elastic bandage, air cast, special boot, or splint to reduce swelling  Ask your healthcare provider which compression device to use, and how tight it should be  · Elevate  the injured area above the level of your heart as often as you can  This will help decrease swelling and pain   If possible, prop the injured area on pillows or blankets to keep it elevated comfortably  Contact your healthcare provider if:   · Your pain does not decrease, even after treatment  · You have questions or concerns about your condition or care  Return to the emergency department if:   · You have severe pain in the injured area  · You have numbness in the injured area  · You cannot put any weight on or move the injured area  © 2017 [employer] Information is for End User's use only and may not be sold, redistributed or otherwise used for commercial purposes  All illustrations and images included in CareNotes® are the copyrighted property of A D A M [employer]  or Madi Sarah  The above information is an  only  It is not intended as medical advice for individual conditions or treatments  Talk to your doctor, nurse or pharmacist before following any medical regimen to see if it is safe and effective for you  Splint Care   WHAT YOU NEED TO KNOW:   Splint care is important to help protect your splint until it comes off  Some splints are made of fiberglass or plaster that will need to dry and harden  Splint care will help the splint dry and harden correctly  Even after your splint hardens, it can be damaged  DISCHARGE INSTRUCTIONS:   Return to the emergency department if:   · You have increased pain  · Your fingers or toes are numb or tingling  · You feel burning or stinging around your injury  · Your nails, fingers, or toes turn pale, blue, or gray, and feel cold  · You have new or increased trouble moving your fingers or toes  · Your swelling gets worse  · The skin under your splint is bleeding or leaking pus  Contact your healthcare provider if:   · Your hard splint gets wet or is damaged  · You have a fever  · Your splint feels tighter  · You have itchy, dry skin under your splint that is getting worse      · The skin under your splint is red, or you have a new sore  · You notice a bad smell coming from your splint  · You have questions or concerns about your condition or care  How to care for your splint:   · Wait for your hard splint to harden completely  You may have to wait up to 3 days before you can walk on a plaster splint  · Check your splint and the skin around it each day  Check your splint for damage, such as cracks and breaks  Check your skin for redness, increased swelling, and sores  Loosen the elastic bandage around your splint if it feels too tight  · Keep your splint clean and dry  Keep dirt out of your splint  Before you bathe, wrap your hard splint with 2 layers of plastic  Then put a plastic bag over it  Keep the plastic bag tightly sealed  You can also ask your healthcare provider about waterproof shields  Do not put your hard splint in the water , even with a plastic bag over it  A wet splint can make your skin itchy, and may lead to infection  · Do not put powders or deodorants inside your splint  These can dry your skin and increase itching  · Do not try to scratch the skin inside your hard splint with sharp objects  Sharp objects can break off inside your splint or hurt your skin  · Do not pull the padding out of your splint  The padding inside your splint protects your skin  You may develop a sore on your skin if you take out the padding  Follow up with your healthcare provider as directed within 1 to 2 weeks:  Write down your questions so you remember to ask them during your visits  © 2017 [employer] Information is for End User's use only and may not be sold, redistributed or otherwise used for commercial purposes  All illustrations and images included in CareNotes® are the copyrighted property of [employer]  or Madi Sarah  The above information is an  only  It is not intended as medical advice for individual conditions or treatments   Talk to your doctor, nurse or pharmacist before following any medical regimen to see if it is safe and effective for you

## 2018-01-19 NOTE — ED NOTES
Sent by hand surgeon for x-rays, stitches removed recently from right hand surgery       Filipe Nichols RN  01/19/18 2358

## 2018-01-19 NOTE — ED ATTENDING ATTESTATION
Amadeo Milton DO, saw and evaluated the patient  I have discussed the patient with the resident/non-physician practitioner and agree with the resident's/non-physician practitioner's findings, Plan of Care, and MDM as documented in the resident's/non-physician practitioner's note, except where noted  All available labs and Radiology studies were reviewed  At this point I agree with the current assessment done in the Emergency Department  I have conducted an independent evaluation of this patient a history and physical is as follows:      Critical Care Time  CritCare Time    Procedures   69-year-old female presents to emergency department complaining of right palm pain and also right lower rib pain after a trip and fall just prior to coming to the emergency department  Patient did not strike her head  She is status post carpal tunnel release on the right January 2nd  She had her stitches removed today  She called her surgeon and was told to come to the emergency department to rule out fracture  On exam she is awake and alert and in no distress  Examination of the surgical site reveals a well-healed incision to right palm  There is minimal swelling but tenderness over the palm and distal ulna  No deformity or ecchymosis noted  She also has tenderness across the lower ribs without crepitus ecchymosis or abrasions  Her lungs are clear  Abdomen is soft and nontender

## 2018-01-21 NOTE — PROGRESS NOTES
Assessment   1  Aftercare following surgery (V58 89) (Z48 89)    Plan    · Follow-up PRN Evaluation and Treatment  Follow-up  Status: Complete  Done:    [id number]    Discussion/Summary      77-year-old female now 2 weeks status post endoscopic right carpal tunnel release and right ring finger trigger finger release  Today the patient reports no recurrence of triggering of her right ring finger and the patient states her numbness has resolved  The patient does have some mild pillar pain which should improve over the next few weeks  The patient was educated regarding scar massage and the need for moisturizers for her incisions  Quads no therapy patient has no restrictions  Patient states she will call does issues arise otherwise the patient may follow-up as needed         Chief Complaint   1  Hand Problem   2  Hand Problem  Postop      Post-Op   Post-Op UE:      Right side,  status post endoscopic carpal tunnel release and trigger finger release on January 2, 2018 72 year female now 2 weeks removed from endoscopic right carpal tunnel release and right ring finger trigger finger release  At this time the patient is doing well she reports no concerns about infection  She denies residual numbness in her him she states she has had some improvement of her strength  She has reported some pillar type pain which is very mild  HPI: The patient reports no fevers,-- no chills,-- no numbness,-- no excessive pain,-- no swelling,-- no nausea-- and-- no stiffness  PE: The surgical incision site was healed-- and-- clean, dry and intact (the incision site was intact, Sutures removed and had no drainage )  The surrounding skin findings included normal appearance  The surgical incision site demonstrates no warmth,-- no induration,-- no erythema-- and-- no ecchymosis  ROM is as expected  Full composite fist  Full wrist flexion  Full wrist extention  Full pronantion  Full supination  Full elbow extention  Full opposition   2+ radial artery pulse on the operative side  Capillary refill is < 2 seconds Peripheral neurovascular exam reveals sensation intact  Assessment: Post-op, the patient is doing well,-- has excellent pain control-- and-- no signs of infection  Preoperative symptoms improved  Plan: Activity Restrictions: advance as tolerated  Done this visit: remove sutures/staples  Follow up: Patient will call if issues arise otherwise patient has no restrictions  Active Problems   1  Carpal tunnel syndrome of right wrist (354 0) (G56 01)   2  Chronic neck pain (723 1,338 29) (M54 2,G89 29)   3  Degenerative cervical spinal stenosis (723 0) (M48 02)   4  Depression with anxiety (300 4) (F41 8)   5  Encounter for screening mammogram for breast cancer (V76 12) (Z12 31)   6  Hyperlipidemia (272 4) (E78 5)   7  Hypothyroidism (244 9) (E03 9)   8  Insomnia (780 52) (G47 00)   9  Menopause (627 2) (Z78 0)   10  Migraine headache (346 90) (G43 909)   11  Need for influenza vaccination (V04 81) (Z23)   12  Obesity (BMI 30 0-34 9) (278 00) (E66 9)   13  Shoulder pain, right (719 41) (M25 511)   14  Trigger ring finger of right hand (727 03) (M65 341)    Current Meds    1  Acyclovir 400 MG Oral Tablet; TAKE 1 TABLET DAILY PRN FOR HERPES OUTBREAK; Therapy: [id number] to Recorded   2  Atorvastatin Calcium 20 MG Oral Tablet; Take 1 tablet daily; Therapy: 87Lka7808 to (Kelsey Hali)  Requested for: 80GED6890; Last     Rx:30Nov2017 Ordered   3  B-12 500 MCG Oral Tablet; TAKE 1 TABLET DAILY; Therapy: 03Ymc9114 to (Evaluate:19Oct2017) Recorded   4  Biotin 5000 MCG Oral Capsule; take  1  caps  daily; Therapy: 48Ouu1870 to Recorded   5  Levothyroxine Sodium 88 MCG Oral Tablet; take 1 tablet by mouth daily; Therapy: 35Rys4855 to (Kelsey Hali)  Requested for: 07BBX1684; Last     Rx:30Nov2017 Ordered   6  Multi-Vitamin Oral Tablet; take 1 tab daily; Therapy: 33Ztv1652 to Recorded   7   SUMAtriptan Succinate 100 MG Oral Tablet; TAKE 1 TABLET AT ONSET OF MIGRAINE     HEADACHE  MAY REPEAT IN 2 HOURS IF NEEDED; Therapy: 56UKV9712 to (Glen Craze)  Requested for: [id number]; Last     Rx:30Nov2017 Ordered   8  TraMADol HCl - 50 MG Oral Tablet; TAKE 1 TABLET BID PRN FOR PAIN;     Therapy: 19Sep2017 to (Last Rx:19Sep2017) Ordered   9  TraZODone HCl - 50 MG Oral Tablet; take 2 tablets at bedtime; Therapy: 19Sep2017 to (Evaluate:20Mar2018)  Requested for: 20Nov2017; Last     Rx:20Nov2017 Ordered   10  Venlafaxine HCl - 75 MG Oral Tablet; TAKE 2 TABLETS TWICE DAILY; Therapy: 19Sep2017 to (Criselad Horse)  Requested for: 09AHZ6893; Last      Rx:30Nov2017 Ordered   11  Vitamin D3 5000 UNIT Oral Tablet; Take 1 tab  Daily; Therapy: 19Sep2017 to Recorded    Allergies   1  Adhesive Tape TAPE   2  Codeine Derivatives   3  Lisinopril TABS   4  Paxil TABS    Vitals   Signs   Heart Rate: 82  Systolic: 876  Diastolic: 76  Height: 5 ft 5 5 in  Weight: 197 lb   BMI Calculated: 32 28  BSA Calculated: 1 98    Future Appointments      Date/Time Provider Specialty Site   03/19/2018 04:30 PM JAYLENE Beasley   Family Medicine [address] [place] [address]     Signatures    Electronically signed by : Jearldine Goodell, M D ; Jan 20 2018 11:37AM EST                       (Author)

## 2018-01-22 VITALS
DIASTOLIC BLOOD PRESSURE: 84 MMHG | BODY MASS INDEX: 31.72 KG/M2 | WEIGHT: 197.38 LBS | HEART RATE: 76 BPM | SYSTOLIC BLOOD PRESSURE: 149 MMHG | HEIGHT: 66 IN

## 2018-01-22 VITALS
WEIGHT: 197 LBS | SYSTOLIC BLOOD PRESSURE: 126 MMHG | DIASTOLIC BLOOD PRESSURE: 76 MMHG | BODY MASS INDEX: 31.66 KG/M2 | HEIGHT: 66 IN | HEART RATE: 82 BPM

## 2018-01-24 ENCOUNTER — OFFICE VISIT (OUTPATIENT)
Dept: OBGYN CLINIC | Facility: CLINIC | Age: 73
End: 2018-01-24
Payer: COMMERCIAL

## 2018-01-24 VITALS — HEART RATE: 91 BPM | DIASTOLIC BLOOD PRESSURE: 82 MMHG | HEIGHT: 66 IN | SYSTOLIC BLOOD PRESSURE: 120 MMHG

## 2018-01-24 DIAGNOSIS — S62.101A CLOSED FRACTURE OF RIGHT WRIST, INITIAL ENCOUNTER: Primary | ICD-10-CM

## 2018-01-24 PROCEDURE — 99214 OFFICE O/P EST MOD 30 MIN: CPT

## 2018-01-24 PROCEDURE — 26600 TREAT METACARPAL FRACTURE: CPT

## 2018-01-24 RX ORDER — SUMATRIPTAN 100 MG/1
1 TABLET, FILM COATED ORAL
COMMUNITY
Start: 2017-11-30 | End: 2018-10-03 | Stop reason: SDUPTHER

## 2018-01-24 RX ORDER — TRAZODONE HYDROCHLORIDE 50 MG/1
2 TABLET ORAL
COMMUNITY
Start: 2017-09-19 | End: 2018-03-21 | Stop reason: SDUPTHER

## 2018-01-24 RX ORDER — LEVOTHYROXINE SODIUM 88 UG/1
1 TABLET ORAL DAILY
COMMUNITY
Start: 2017-09-19 | End: 2018-11-23 | Stop reason: SDUPTHER

## 2018-01-24 RX ORDER — VENLAFAXINE 75 MG/1
2 TABLET ORAL 2 TIMES DAILY
COMMUNITY
Start: 2017-09-19 | End: 2018-11-23 | Stop reason: SDUPTHER

## 2018-01-24 RX ORDER — TRAMADOL HYDROCHLORIDE 50 MG/1
TABLET ORAL
COMMUNITY
Start: 2017-09-19

## 2018-01-24 RX ORDER — ATORVASTATIN CALCIUM 20 MG/1
1 TABLET, FILM COATED ORAL DAILY
COMMUNITY
Start: 2017-09-19 | End: 2018-11-23 | Stop reason: SDUPTHER

## 2018-01-24 RX ORDER — ACYCLOVIR 400 MG/1
TABLET ORAL
COMMUNITY
Start: 2017-09-20 | End: 2018-03-05 | Stop reason: SDUPTHER

## 2018-01-24 NOTE — PROGRESS NOTES
Assessment/Plan:  Assessment/Plan   There are no diagnoses linked to this encounter  Subjective:   Patient ID: Maylin Hickey is a 67 y o  female  Patient is a 51-year-old female presenting today for evaluation of right wrist pain  She reports recently undergoing a carpal tunnel release and trigger finger release on January 2, 2017 with Dr Citlaly Murphy and has since been discharged from under his care  Today she presents with a new injury to the right wrist following a fall on January 19, 2018  She reported immediate onset of pain along the lateral and dorsal aspects of the wrist  Pain continues today is a throbbing, achy pain  Pain is reproduced with any movement specifically supination of the wrist   She has been using ice and anti-inflammatories which have provided minimal relief  There is no radiation of symptoms  She denies any numbness or tingling  She denies any warmth or crepitus  Review of Systems   Constitutional: Negative  HENT: Negative  Eyes: Negative  Respiratory: Negative  Cardiovascular: Negative  Gastrointestinal: Negative  Genitourinary: Negative  Musculoskeletal: Positive for arthralgias and myalgias  Skin: Negative  Allergic/Immunologic: Negative  Neurological: Negative  Hematological: Negative  Psychiatric/Behavioral: Negative  Objective:  Right Hand Exam     Tenderness   The patient is experiencing tenderness in the dorsal area  Range of Motion     Wrist   Extension: abnormal   Flexion: abnormal   Pronation: abnormal   Supination: abnormal     Muscle Strength   Wrist Extension: 4/5   Wrist Flexion: 4/5     Other   Erythema: present  Scars: present  Pulse: present      Left Hand Exam   Left hand exam is normal             Physical Exam   Constitutional: She is oriented to person, place, and time  Vital signs are normal  She appears well-developed  HENT:   Head: Normocephalic     Eyes: Pupils are equal, round, and reactive to light  Pulmonary/Chest: Effort normal    Neurological: She is alert and oriented to person, place, and time  Skin: Skin is warm and dry  Psychiatric: She has a normal mood and affect  Nursing note and vitals reviewed  I have personally reviewed pertinent films in PACS

## 2018-01-24 NOTE — PROGRESS NOTES
Cast application  Date/Time: 1/24/2018 11:58 AM  Performed by: Aviva Forde  Authorized by: Navin Mata     Consent:     Consent obtained:  Verbal    Consent given by:  Patient  Pre-procedure details:     Sensation:  Normal  Procedure details:     Laterality:  Right    Location:  Wrist    Wrist:  R wrist    Strapping: no  Cast type:  Short arm    Supplies:  Cotton padding and fiberglass  Post-procedure details:     Pain:  Improved    Sensation:  Normal    Patient tolerance of procedure:   Tolerated well, no immediate complications

## 2018-01-28 PROBLEM — E66.9 OBESITY (BMI 30.0-34.9): Status: ACTIVE | Noted: 2018-01-28

## 2018-01-28 PROBLEM — M48.02 CERVICAL SPINAL STENOSIS: Status: ACTIVE | Noted: 2018-01-28

## 2018-01-28 PROBLEM — E03.9 ACQUIRED HYPOTHYROIDISM: Status: ACTIVE | Noted: 2018-01-28

## 2018-01-28 PROBLEM — E78.2 HYPERLIPEMIA, MIXED: Status: ACTIVE | Noted: 2018-01-28

## 2018-01-28 PROBLEM — M54.2 CHRONIC NECK PAIN: Status: ACTIVE | Noted: 2018-01-28

## 2018-01-28 PROBLEM — G47.00 INSOMNIA: Status: ACTIVE | Noted: 2018-01-28

## 2018-01-28 PROBLEM — G89.29 CHRONIC NECK PAIN: Status: ACTIVE | Noted: 2018-01-28

## 2018-01-28 PROBLEM — S52.614A CLOSED NONDISPLACED FRACTURE OF STYLOID PROCESS OF RIGHT ULNA: Status: ACTIVE | Noted: 2018-01-28

## 2018-01-28 PROBLEM — F41.8 DEPRESSION WITH ANXIETY: Status: ACTIVE | Noted: 2018-01-28

## 2018-01-29 RX ORDER — CYANOCOBALAMIN (VITAMIN B-12) 500 MCG
1 TABLET ORAL DAILY
COMMUNITY
Start: 2017-09-19

## 2018-02-08 ENCOUNTER — TELEPHONE (OUTPATIENT)
Dept: FAMILY MEDICINE CLINIC | Facility: CLINIC | Age: 73
End: 2018-02-08

## 2018-02-08 NOTE — LETTER
February 9, 2018     Patient: Geno Martinez   YOB: 1945   Date of Visit: 2/8/2018       To Whom it May Concern:    Delmi Bogdan is under my professional care  She was out of work from 02/05/2018-02/09/2018  She may return to work on 02/12/2018  If you have any questions or concerns, please don't hesitate to call           Sincerely,          JAYLENE Edgar

## 2018-02-08 NOTE — TELEPHONE ENCOUNTER
Patient called complaining of a high temperature, diarrhea, and fatigue for 3 days  Light cough, headaches/migraines, temp is around 99 7-99 9  Taking in lots of fluids and sleeping, but no relief  Worried this may be flu related, she did get flu shot  No SOB or chest pains

## 2018-02-09 DIAGNOSIS — R11.0 NAUSEA: Primary | ICD-10-CM

## 2018-02-09 RX ORDER — ONDANSETRON 4 MG/1
4 TABLET, FILM COATED ORAL EVERY 8 HOURS PRN
Qty: 20 TABLET | Refills: 0 | Status: SHIPPED | OUTPATIENT
Start: 2018-02-09 | End: 2018-03-19 | Stop reason: HOSPADM

## 2018-02-09 NOTE — TELEPHONE ENCOUNTER
Called patient with instructions  She is refusing to be seen due to feeling ill and unsafe to drive and has no one else to, so I advised her to take tylenol for fever and to rest and drink fluids  We will mail a note stating she was out of work due to these symptoms

## 2018-02-09 NOTE — TELEPHONE ENCOUNTER
Please call patient  She needs to be evaluated in order to prescribe appropriate medications  If patient cannot come today to be seen, she needs to go to the nearest urgent care center or emergency room for evaluation and treatment

## 2018-02-09 NOTE — TELEPHONE ENCOUNTER
Call patient  Recommend to stay well hydrated,  take Tylenol as needed  for fever  Rx sent to the pharmacy for Zofran 4 mg to take 1 tablet every 6-8 hours as needed for nausea  Strongly advised patient to go to the emergency room of symptoms persist or worsen

## 2018-02-21 ENCOUNTER — OFFICE VISIT (OUTPATIENT)
Dept: OBGYN CLINIC | Facility: CLINIC | Age: 73
End: 2018-02-21

## 2018-02-21 ENCOUNTER — APPOINTMENT (OUTPATIENT)
Dept: RADIOLOGY | Facility: CLINIC | Age: 73
End: 2018-02-21

## 2018-02-21 VITALS
DIASTOLIC BLOOD PRESSURE: 72 MMHG | SYSTOLIC BLOOD PRESSURE: 116 MMHG | WEIGHT: 190 LBS | HEART RATE: 78 BPM | HEIGHT: 67 IN | BODY MASS INDEX: 29.82 KG/M2

## 2018-02-21 DIAGNOSIS — S62.101S RIGHT WRIST FRACTURE, SEQUELA: Primary | ICD-10-CM

## 2018-02-21 PROBLEM — S52.614A CLOSED NONDISPLACED FRACTURE OF STYLOID PROCESS OF RIGHT ULNA: Status: RESOLVED | Noted: 2018-01-28 | Resolved: 2018-02-21

## 2018-02-21 PROCEDURE — 99024 POSTOP FOLLOW-UP VISIT: CPT | Performed by: FAMILY MEDICINE

## 2018-02-21 PROCEDURE — 73110 X-RAY EXAM OF WRIST: CPT

## 2018-02-21 NOTE — PROGRESS NOTES
Assessment:     1  Right wrist fracture, sequela        Plan:     Problem List Items Addressed This Visit     Right wrist fracture, sequela - Primary    Relevant Orders    XR wrist 3+ vw right    Ambulatory referral to Physical Therapy         Subjective:     Patient ID: Shayna Albrecht is a 67 y o  female  Chief Complaint:  Patient presents today for follow-up of ulna styloid fracture  Allergy:  Allergies   Allergen Reactions    Codeine Nausea Only    Lisinopril     Paroxetine     Other Rash     Adhesive tape     Medications:  all current active meds have been reviewed  Past Medical History:  Past Medical History:   Diagnosis Date    Closed nondisplaced fracture of styloid process of right ulna 1/28/2018    Depression     Disease of thyroid gland     Hypertension      Past Surgical History:  Past Surgical History:   Procedure Laterality Date    CHOLECYSTECTOMY      19 SEPT 2017 LAST ASSESSED    GASTRECTOMY      SLEEVE  19 SEPT 2017 LAST ASSESSED    KNEE SURGERY      DE INCISE FINGER TENDON SHEATH Right 1/2/2018    Procedure: RING TRIGGER FINGER RELEASE;  Surgeon: Leopold Mitts, MD;  [facility];  Service: Orthopedics    DE WRIST Marquis Flair LIG Right 1/2/2018    Procedure: RELEASE CARPAL TUNNEL ENDOSCOPIC;  Surgeon: Leopold Mitts, MD;  [facility];  Service: Orthopedics     Family History:  Family History   Problem Relation Age of Onset    Cancer Mother      lung    Cancer Sister      lung esophagus    Cancer Maternal Grandfather     Alcohol abuse Son     Depression Daughter      Social History:  History   Alcohol Use    Yes     Comment: moderately     History   Drug Use No     History   Smoking Status    Never Smoker   Smokeless Tobacco    Never Used     Review of Systems   Constitutional: Negative  HENT: Negative  Eyes: Negative  Respiratory: Negative  Cardiovascular: Negative  Gastrointestinal: Negative  Genitourinary: Negative  Musculoskeletal: Positive for arthralgias and myalgias  Skin: Negative  Allergic/Immunologic: Negative  Neurological: Negative  Hematological: Negative  Psychiatric/Behavioral: Negative  Objective:  BP Readings from Last 1 Encounters:   02/21/18 116/72      Wt Readings from Last 1 Encounters:   02/21/18 86 2 kg (190 lb)      BMI:   Estimated body mass index is 30 21 kg/m² as calculated from the following:    Height as of this encounter: 5' 6 5" (1 689 m)  Weight as of this encounter: 86 2 kg (190 lb)  BSA:   Estimated body surface area is 1 97 meters squared as calculated from the following:    Height as of this encounter: 5' 6 5" (1 689 m)  Weight as of this encounter: 86 2 kg (190 lb)  Physical Exam   Constitutional: She is oriented to person, place, and time  Vital signs are normal  She appears well-developed  HENT:   Head: Normocephalic  Eyes: Pupils are equal, round, and reactive to light  Pulmonary/Chest: Effort normal    Neurological: She is alert and oriented to person, place, and time  Skin: Skin is warm and dry  Psychiatric: She has a normal mood and affect  Nursing note and vitals reviewed  Right Hand Exam     Range of Motion     Wrist   Extension: abnormal   Flexion: abnormal   Pronation: abnormal   Supination: abnormal     Muscle Strength   The patient has normal right wrist strength  Other   Erythema: absent  Sensation: normal  Pulse: present            I have personally reviewed pertinent films in PACS and my interpretation is Appropriate callus formation and healing

## 2018-03-05 ENCOUNTER — TELEPHONE (OUTPATIENT)
Dept: FAMILY MEDICINE CLINIC | Facility: CLINIC | Age: 73
End: 2018-03-05

## 2018-03-05 DIAGNOSIS — B00.9 HERPES INFECTION: Primary | ICD-10-CM

## 2018-03-05 RX ORDER — ACYCLOVIR 400 MG/1
400 TABLET ORAL DAILY
Qty: 30 TABLET | Refills: 3 | Status: SHIPPED | OUTPATIENT
Start: 2018-03-05 | End: 2018-07-13 | Stop reason: SDUPTHER

## 2018-03-05 NOTE — TELEPHONE ENCOUNTER
Checked in [employer], back in 09/20/17 it was recorded as history, to take 1 tab daily for herpes outbreak, 30 tabs 5 refills prescribed by you

## 2018-03-05 NOTE — TELEPHONE ENCOUNTER
Please check who prescribed Acyclovir for patient previously  It is not on med  List   Check diagnosis

## 2018-03-18 PROBLEM — E78.5 HYPERLIPIDEMIA: Status: ACTIVE | Noted: 2018-01-28

## 2018-03-18 PROBLEM — Z78.0 MENOPAUSE: Status: ACTIVE | Noted: 2017-09-19

## 2018-03-18 PROBLEM — F51.04 CHRONIC INSOMNIA: Status: ACTIVE | Noted: 2018-01-28

## 2018-03-18 PROBLEM — G43.909 MIGRAINE HEADACHE: Status: ACTIVE | Noted: 2017-11-30

## 2018-03-19 ENCOUNTER — OFFICE VISIT (OUTPATIENT)
Dept: FAMILY MEDICINE CLINIC | Facility: CLINIC | Age: 73
End: 2018-03-19
Payer: MEDICARE

## 2018-03-19 VITALS
RESPIRATION RATE: 16 BRPM | OXYGEN SATURATION: 97 % | HEIGHT: 67 IN | SYSTOLIC BLOOD PRESSURE: 120 MMHG | HEART RATE: 66 BPM | TEMPERATURE: 97.4 F | DIASTOLIC BLOOD PRESSURE: 82 MMHG | WEIGHT: 191 LBS | BODY MASS INDEX: 29.98 KG/M2

## 2018-03-19 DIAGNOSIS — G89.29 CHRONIC NECK PAIN: ICD-10-CM

## 2018-03-19 DIAGNOSIS — E78.2 MIXED HYPERLIPIDEMIA: ICD-10-CM

## 2018-03-19 DIAGNOSIS — E03.9 ACQUIRED HYPOTHYROIDISM: Primary | ICD-10-CM

## 2018-03-19 DIAGNOSIS — M54.2 CHRONIC NECK PAIN: ICD-10-CM

## 2018-03-19 DIAGNOSIS — Z12.39 ENCOUNTER FOR OTHER SCREENING FOR MALIGNANT NEOPLASM OF BREAST: ICD-10-CM

## 2018-03-19 DIAGNOSIS — F51.04 CHRONIC INSOMNIA: ICD-10-CM

## 2018-03-19 DIAGNOSIS — Z78.0 MENOPAUSE: ICD-10-CM

## 2018-03-19 DIAGNOSIS — M48.02 DEGENERATIVE CERVICAL SPINAL STENOSIS: ICD-10-CM

## 2018-03-19 DIAGNOSIS — Z12.39 ENCOUNTER FOR SCREENING FOR MALIGNANT NEOPLASM OF BREAST: ICD-10-CM

## 2018-03-19 DIAGNOSIS — F41.8 DEPRESSION WITH ANXIETY: ICD-10-CM

## 2018-03-19 PROCEDURE — 99214 OFFICE O/P EST MOD 30 MIN: CPT | Performed by: FAMILY MEDICINE

## 2018-03-21 DIAGNOSIS — F51.04 CHRONIC INSOMNIA: Primary | ICD-10-CM

## 2018-03-21 RX ORDER — TRAZODONE HYDROCHLORIDE 50 MG/1
TABLET ORAL
Qty: 60 TABLET | Refills: 3 | Status: SHIPPED | OUTPATIENT
Start: 2018-03-21 | End: 2018-07-23 | Stop reason: SDUPTHER

## 2018-03-28 ENCOUNTER — OFFICE VISIT (OUTPATIENT)
Dept: OBGYN CLINIC | Facility: CLINIC | Age: 73
End: 2018-03-28

## 2018-03-28 VITALS — SYSTOLIC BLOOD PRESSURE: 127 MMHG | HEART RATE: 72 BPM | DIASTOLIC BLOOD PRESSURE: 79 MMHG | HEIGHT: 66 IN

## 2018-03-28 DIAGNOSIS — S62.101D CLOSED FRACTURE OF RIGHT WRIST WITH ROUTINE HEALING, SUBSEQUENT ENCOUNTER: Primary | ICD-10-CM

## 2018-03-28 PROCEDURE — 99024 POSTOP FOLLOW-UP VISIT: CPT | Performed by: FAMILY MEDICINE

## 2018-03-28 NOTE — PROGRESS NOTES
Assessment:     1  Closed fracture of right wrist with routine healing, subsequent encounter        Plan:     Problem List Items Addressed This Visit     None      Visit Diagnoses     Closed fracture of right wrist with routine healing, subsequent encounter    -  Primary         Subjective:     Patient ID: Uche Jara is a 67 y o  female  Chief Complaint:  Patient with good overall improvements of her wrist pain since completing 3 weeks of physical therapy  She does express some concerns about the persistent swelling along the dorsal aspect of the wrist  Her wrist strength is not quite yet to her previous level prior to her fracture  No numbness or tingling  No warmth or crepitus        Allergy:  Allergies   Allergen Reactions    Codeine Nausea Only    Lisinopril     Paroxetine     Other Rash     Adhesive tape     Medications:  all current active meds have been reviewed  Past Medical History:  Past Medical History:   Diagnosis Date    Closed nondisplaced fracture of styloid process of right ulna 1/28/2018    Depression     Disease of thyroid gland     Hypertension      Past Surgical History:  Past Surgical History:   Procedure Laterality Date    CHOLECYSTECTOMY      19 SEPT 2017 LAST ASSESSED    GASTRECTOMY      SLEEVE  19 SEPT 2017 LAST ASSESSED    GASTRECTOMY SLEEVE LAPAROSCOPIC      KNEE SURGERY      [place] INCISE FINGER TENDON SHEATH Right 1/2/2018    Procedure: RING TRIGGER FINGER RELEASE;  Surgeon: Medina Zamudio MD;  [facility];  Service: Orthopedics    IL WRIST Juhi Small LIG Right 1/2/2018    Procedure: RELEASE CARPAL TUNNEL ENDOSCOPIC;  Surgeon: Medina Zamudio MD;  [facility];  Service: Orthopedics     Family History:  Family History   Problem Relation Age of Onset    Cancer Mother      lung    Cancer Sister      lung esophagus    Cancer Maternal Grandfather     Alcohol abuse Son     Depression Daughter      Social History:  History   Alcohol Use    Yes     Comment: moderately     History   Drug Use No     History   Smoking Status    Never Smoker   Smokeless Tobacco    Never Used     Review of Systems   Constitutional: Negative  HENT: Negative  Eyes: Negative  Respiratory: Negative  Cardiovascular: Negative  Gastrointestinal: Negative  Genitourinary: Negative  Musculoskeletal: Positive for arthralgias and myalgias  Skin: Negative  Allergic/Immunologic: Negative  Neurological: Negative  Hematological: Negative  Psychiatric/Behavioral: Negative  Objective:  BP Readings from Last 1 Encounters:   03/28/18 127/79      Wt Readings from Last 1 Encounters:   03/19/18 86 6 kg (191 lb)      BMI:   Estimated body mass index is 30 37 kg/m² as calculated from the following:    Height as of 3/19/18: 5' 6 5" (1 689 m)  Weight as of 3/19/18: 86 6 kg (191 lb)  BSA:   Estimated body surface area is 1 97 meters squared as calculated from the following:    Height as of 3/19/18: 5' 6 5" (1 689 m)  Weight as of 3/19/18: 86 6 kg (191 lb)  Physical Exam   Constitutional: She is oriented to person, place, and time  Vital signs are normal  She appears well-developed  HENT:   Head: Normocephalic  Eyes: Pupils are equal, round, and reactive to light  Pulmonary/Chest: Effort normal    Musculoskeletal: Normal range of motion  Neurological: She is alert and oriented to person, place, and time  Skin: Skin is warm and dry  Psychiatric: She has a normal mood and affect  Nursing note and vitals reviewed  Right Hand Exam     Tenderness   The patient is experiencing tenderness in the dorsal area  Range of Motion     Wrist   Extension: normal   Flexion: normal   Pronation: normal   Supination: normal     Muscle Strength   The patient has normal right wrist strength      Other   Erythema: absent  Sensation: normal  Pulse: present

## 2018-04-13 ENCOUNTER — APPOINTMENT (OUTPATIENT)
Dept: LAB | Facility: CLINIC | Age: 73
End: 2018-04-13
Payer: COMMERCIAL

## 2018-04-13 ENCOUNTER — TRANSCRIBE ORDERS (OUTPATIENT)
Dept: LAB | Facility: CLINIC | Age: 73
End: 2018-04-13

## 2018-04-13 DIAGNOSIS — E03.9 ACQUIRED HYPOTHYROIDISM: ICD-10-CM

## 2018-04-13 DIAGNOSIS — E78.2 MIXED HYPERLIPIDEMIA: ICD-10-CM

## 2018-04-13 LAB
ALBUMIN SERPL BCP-MCNC: 3.5 G/DL (ref 3.5–5)
ALP SERPL-CCNC: 88 U/L (ref 46–116)
ALT SERPL W P-5'-P-CCNC: 20 U/L (ref 12–78)
ANION GAP SERPL CALCULATED.3IONS-SCNC: 6 MMOL/L (ref 4–13)
AST SERPL W P-5'-P-CCNC: 16 U/L (ref 5–45)
BASOPHILS # BLD AUTO: 0.04 THOUSANDS/ΜL (ref 0–0.1)
BASOPHILS NFR BLD AUTO: 1 % (ref 0–1)
BILIRUB SERPL-MCNC: 0.49 MG/DL (ref 0.2–1)
BUN SERPL-MCNC: 15 MG/DL (ref 5–25)
CALCIUM SERPL-MCNC: 8.8 MG/DL
CHLORIDE SERPL-SCNC: 109 MMOL/L (ref 100–108)
CHOLEST SERPL-MCNC: 162 MG/DL (ref 50–200)
CO2 SERPL-SCNC: 26 MMOL/L (ref 21–32)
CREAT SERPL-MCNC: 0.8 MG/DL (ref 0.6–1.3)
EOSINOPHIL # BLD AUTO: 0.1 THOUSAND/ΜL (ref 0–0.61)
EOSINOPHIL NFR BLD AUTO: 2 % (ref 0–6)
ERYTHROCYTE [DISTWIDTH] IN BLOOD BY AUTOMATED COUNT: 12.9 % (ref 11.6–15.1)
GFR SERPL CREATININE-BSD FRML MDRD: 74 ML/MIN/1.73SQ M
GLUCOSE P FAST SERPL-MCNC: 87 MG/DL (ref 65–99)
HCT VFR BLD AUTO: 40.1 % (ref 34.8–46.1)
HDLC SERPL-MCNC: 71 MG/DL (ref 40–60)
HGB BLD-MCNC: 12.9 G/DL (ref 11.5–15.4)
LDLC SERPL CALC-MCNC: 77 MG/DL (ref 0–100)
LYMPHOCYTES # BLD AUTO: 1.46 THOUSANDS/ΜL (ref 0.6–4.47)
LYMPHOCYTES NFR BLD AUTO: 25 % (ref 14–44)
MCH RBC QN AUTO: 31.3 PG (ref 26.8–34.3)
MCHC RBC AUTO-ENTMCNC: 32.2 G/DL (ref 31.4–37.4)
MCV RBC AUTO: 97 FL (ref 82–98)
MONOCYTES # BLD AUTO: 0.38 THOUSAND/ΜL (ref 0.17–1.22)
MONOCYTES NFR BLD AUTO: 6 % (ref 4–12)
NEUTROPHILS # BLD AUTO: 3.94 THOUSANDS/ΜL (ref 1.85–7.62)
NEUTS SEG NFR BLD AUTO: 66 % (ref 43–75)
NONHDLC SERPL-MCNC: 91 MG/DL
NRBC BLD AUTO-RTO: 0 /100 WBCS
PLATELET # BLD AUTO: 311 THOUSANDS/UL (ref 149–390)
PMV BLD AUTO: 10.8 FL (ref 8.9–12.7)
POTASSIUM SERPL-SCNC: 4.2 MMOL/L (ref 3.5–5.3)
PROT SERPL-MCNC: 7 G/DL (ref 6.4–8.2)
RBC # BLD AUTO: 4.12 MILLION/UL (ref 3.81–5.12)
SODIUM SERPL-SCNC: 141 MMOL/L (ref 136–145)
TRIGL SERPL-MCNC: 70 MG/DL
TSH SERPL DL<=0.05 MIU/L-ACNC: 1.43 UIU/ML (ref 0.36–3.74)
WBC # BLD AUTO: 5.93 THOUSAND/UL (ref 4.31–10.16)

## 2018-04-13 PROCEDURE — 80053 COMPREHEN METABOLIC PANEL: CPT

## 2018-04-13 PROCEDURE — 36415 COLL VENOUS BLD VENIPUNCTURE: CPT

## 2018-04-13 PROCEDURE — 80061 LIPID PANEL: CPT

## 2018-04-13 PROCEDURE — 85025 COMPLETE CBC W/AUTO DIFF WBC: CPT

## 2018-04-13 PROCEDURE — 84443 ASSAY THYROID STIM HORMONE: CPT

## 2018-04-27 ENCOUNTER — HOSPITAL ENCOUNTER (OUTPATIENT)
Dept: BONE DENSITY | Facility: MEDICAL CENTER | Age: 73
Discharge: HOME | End: 2018-04-27
Payer: COMMERCIAL

## 2018-04-27 ENCOUNTER — HOSPITAL ENCOUNTER (OUTPATIENT)
Dept: MAMMOGRAPHY | Facility: MEDICAL CENTER | Age: 73
Discharge: HOME | End: 2018-04-27
Payer: COMMERCIAL

## 2018-04-27 DIAGNOSIS — Z78.0 ASYMPTOMATIC MENOPAUSAL STATE: ICD-10-CM

## 2018-04-27 DIAGNOSIS — Z12.39 ENCOUNTER FOR SCREENING FOR MALIGNANT NEOPLASM OF BREAST: ICD-10-CM

## 2018-04-27 PROCEDURE — 77067 SCR MAMMO BI INCL CAD: CPT

## 2018-04-27 PROCEDURE — 77080 DXA BONE DENSITY AXIAL: CPT

## 2018-04-30 DIAGNOSIS — R92.2 BREAST DENSITY: Primary | ICD-10-CM

## 2018-05-03 ENCOUNTER — HOSPITAL ENCOUNTER (OUTPATIENT)
Dept: ULTRASOUND IMAGING | Facility: CLINIC | Age: 73
Discharge: HOME | End: 2018-05-03
Payer: COMMERCIAL

## 2018-05-03 DIAGNOSIS — R92.2 BREAST DENSITY: ICD-10-CM

## 2018-05-03 DIAGNOSIS — N63.0 BREAST NODULE: Primary | ICD-10-CM

## 2018-05-03 PROCEDURE — 76642 ULTRASOUND BREAST LIMITED: CPT

## 2018-05-07 ENCOUNTER — TELEPHONE (OUTPATIENT)
Dept: FAMILY MEDICINE CLINIC | Facility: CLINIC | Age: 73
End: 2018-05-07

## 2018-06-27 ENCOUNTER — OFFICE VISIT (OUTPATIENT)
Dept: FAMILY MEDICINE CLINIC | Facility: CLINIC | Age: 73
End: 2018-06-27
Payer: COMMERCIAL

## 2018-06-27 VITALS
RESPIRATION RATE: 16 BRPM | BODY MASS INDEX: 30.86 KG/M2 | OXYGEN SATURATION: 95 % | SYSTOLIC BLOOD PRESSURE: 124 MMHG | HEIGHT: 66 IN | TEMPERATURE: 98.2 F | HEART RATE: 86 BPM | WEIGHT: 192 LBS | DIASTOLIC BLOOD PRESSURE: 82 MMHG

## 2018-06-27 DIAGNOSIS — F41.8 DEPRESSION WITH ANXIETY: ICD-10-CM

## 2018-06-27 DIAGNOSIS — F32.A FATIGUE DUE TO DEPRESSION: Primary | ICD-10-CM

## 2018-06-27 DIAGNOSIS — E03.9 ACQUIRED HYPOTHYROIDISM: ICD-10-CM

## 2018-06-27 DIAGNOSIS — R53.83 FATIGUE DUE TO DEPRESSION: Primary | ICD-10-CM

## 2018-06-27 PROCEDURE — 99214 OFFICE O/P EST MOD 30 MIN: CPT | Performed by: FAMILY MEDICINE

## 2018-06-27 PROCEDURE — 1036F TOBACCO NON-USER: CPT | Performed by: FAMILY MEDICINE

## 2018-06-27 PROCEDURE — 3008F BODY MASS INDEX DOCD: CPT | Performed by: FAMILY MEDICINE

## 2018-06-27 NOTE — ASSESSMENT & PLAN NOTE
Discussed treatment options with patient  Will start Zoloft 50 mg -1/2 tablet daily  Continue Effexor 75 mg 2 tablets twice daily, Trazodone 50 mg one tablet at bedtime  Provided emotional support to patient  Recommended to schedule appointment with Lila Gill LCSW to discuss psychotherapy, referral to psychiatrist   Abdiaziz Matters to call office with any questions or concerns

## 2018-06-27 NOTE — PROGRESS NOTES
Assessment/Plan:    Depression with anxiety  Discussed treatment options with patient  Will start Zoloft 50 mg -1/2 tablet daily  Continue Effexor 75 mg 2 tablets twice daily, Trazodone 50 mg one tablet at bedtime  Provided emotional support to patient  Recommended to schedule appointment with Jumana Torrez LCSW to discuss psychotherapy, referral to psychiatrist   Letty Hamilton to call office with any questions or concerns  Hypothyroidism  Continue Levothyroxine 88 mcg daily  Check TSH, CBC with dif  I have spent 25 minutes with Patient  today in which greater than 50% of this time was spent in counseling/coordination of care regarding Intructions for management  Schedule follow-up visit in 2-3 weeks  There are no diagnoses linked to this encounter  Subjective:      Patient ID: Reinier Vizcaino is a 67 y o  female  HPI     Patient is 68-year-old female with H/o Hypothyroidism, Depression,  Anxiety  presents to the office c/o feeling tired, worsening depression for the last 6 weeks  Patient lost her job 6 weeks ago  She worries about future  C/o decreased appetite, difficulty concentrating, some chest pressure due to anxiety  No vomiting, diarrhea  Denies difficulty to fall or stay asleep  No suicidal ideations  Most of her family members live in [place]  Patient states that she has friends who are supportive  Patient takes Levothyroxine 88 mcg daily for Hypothyroidism  Last blood test done in April 2018  TSH level was 1 430  Depression/ Anxiety - currently taking Effexor 75 mg -  2 tablets twice daily, Trazodone 50 mg one tablet  at bedtime  PHQ-9 score is 12      The following portions of the patient's history were reviewed and updated as appropriate: allergies, current medications, past family history, past medical history, past social history, past surgical history and problem list     Review of Systems   Constitutional: Positive for appetite change (decreased appetite) and fatigue  Negative for activity change, chills and fever  HENT: Negative  Eyes: Negative for pain, discharge, redness, itching and visual disturbance  Respiratory: Negative for cough, shortness of breath and wheezing  Cardiovascular: Negative for chest pain, palpitations and leg swelling  Gastrointestinal: Positive for nausea (mild nausea)  Negative for abdominal pain, blood in stool, constipation, diarrhea and vomiting  Genitourinary: Negative for difficulty urinating, dysuria, flank pain, frequency and hematuria  Musculoskeletal: Positive for neck pain (chronic neck pain)  Negative for arthralgias, gait problem and joint swelling  Skin: Negative for rash and wound  Neurological: Positive for headaches  Negative for dizziness, seizures and syncope  Hematological: Negative  Psychiatric/Behavioral:        See HPI         Objective:      /82 (BP Location: Left arm, Patient Position: Sitting, Cuff Size: Adult)   Pulse 86   Temp 98 2 °F (36 8 °C) (Tympanic)   Resp 16   Ht 5' 6" (1 676 m)   Wt 87 1 kg (192 lb)   SpO2 95%   BMI 30 99 kg/m²          Physical Exam   Constitutional: She appears well-developed and well-nourished  No distress  HENT:   Head: Normocephalic and atraumatic  Right Ear: External ear normal    Left Ear: External ear normal    Mouth/Throat: Oropharynx is clear and moist    Eyes: Conjunctivae are normal  Pupils are equal, round, and reactive to light  Cardiovascular: Normal rate, regular rhythm and normal heart sounds  No murmur heard  No BL LE edema   Pulmonary/Chest: Effort normal and breath sounds normal  She has no wheezes  She has no rales  Abdominal: Soft  Bowel sounds are normal  There is no tenderness  Musculoskeletal: Normal range of motion  She exhibits no edema, tenderness or deformity  Skin: Skin is warm and dry  No erythema  Psychiatric:   Patient feels depressed  No suicidal ideations      Nursing note and vitals reviewed

## 2018-07-12 ENCOUNTER — TELEPHONE (OUTPATIENT)
Dept: FAMILY MEDICINE CLINIC | Facility: CLINIC | Age: 73
End: 2018-07-12

## 2018-07-13 DIAGNOSIS — B00.9 HERPES INFECTION: ICD-10-CM

## 2018-07-13 RX ORDER — ACYCLOVIR 400 MG/1
TABLET ORAL
Qty: 30 TABLET | Refills: 3 | Status: SHIPPED | OUTPATIENT
Start: 2018-07-13 | End: 2018-11-17 | Stop reason: SDUPTHER

## 2018-07-23 DIAGNOSIS — F51.04 CHRONIC INSOMNIA: ICD-10-CM

## 2018-07-23 RX ORDER — TRAZODONE HYDROCHLORIDE 50 MG/1
TABLET ORAL
Qty: 60 TABLET | Refills: 3 | Status: SHIPPED | OUTPATIENT
Start: 2018-07-23 | End: 2018-11-25 | Stop reason: SDUPTHER

## 2018-08-28 ENCOUNTER — TELEPHONE (OUTPATIENT)
Dept: FAMILY MEDICINE CLINIC | Facility: CLINIC | Age: 73
End: 2018-08-28

## 2018-08-28 ENCOUNTER — OFFICE VISIT (OUTPATIENT)
Dept: FAMILY MEDICINE CLINIC | Facility: CLINIC | Age: 73
End: 2018-08-28
Payer: MEDICARE

## 2018-08-28 VITALS
BODY MASS INDEX: 31.86 KG/M2 | HEART RATE: 80 BPM | TEMPERATURE: 98.2 F | RESPIRATION RATE: 16 BRPM | SYSTOLIC BLOOD PRESSURE: 116 MMHG | WEIGHT: 197.4 LBS | DIASTOLIC BLOOD PRESSURE: 86 MMHG

## 2018-08-28 DIAGNOSIS — R53.83 OTHER FATIGUE: ICD-10-CM

## 2018-08-28 DIAGNOSIS — F51.04 CHRONIC INSOMNIA: Primary | ICD-10-CM

## 2018-08-28 DIAGNOSIS — F41.8 DEPRESSION WITH ANXIETY: ICD-10-CM

## 2018-08-28 DIAGNOSIS — E03.9 ACQUIRED HYPOTHYROIDISM: ICD-10-CM

## 2018-08-28 PROCEDURE — 99214 OFFICE O/P EST MOD 30 MIN: CPT | Performed by: FAMILY MEDICINE

## 2018-08-28 NOTE — PROGRESS NOTES
Assessment/Plan:    Chronic insomnia  Recommended to take Trazodone 50 mg -2 tablets at bedtime  Depression with anxiety  Will increase dose of Zoloft from 25 mg to 50 mg daily  Continue Effexor 75 mg  2 tablets twice daily  Patient declined to start psychotherapy  She wants to establish a psychotherapist when moves to [place]  Advised to call office with update on symptoms in 2 weeks  Other fatigue  Check CBC with dif, TSH  Patient has order for blood work given in 6/18  Hypothyroidism  Continue Levothyroxine 88 mcg daily  Diagnoses and all orders for this visit:  Encounter Diagnoses   Name Primary?  Chronic insomnia Yes    Depression with anxiety     Other fatigue     Acquired hypothyroidism      Screening for colon cancer  -     Ambulatory referral to Gastroenterology; Future    No orders of the defined types were placed in this encounter  Subjective:      Patient ID: Beryle Dross is a 67 y o  female  HPI     Patient presents to the office c/o feeling depressed, stressed out due to financial problems, having difficulty to stay asleep at night, feeling tired  She planned to move to [place] to be close to her family  Patient has history of anxiety, depression, insomnia  She was seen in the office in June 2018, was   started on Zoloft 50 mg - 1/2  tablet daily  Currently taking Effexor 75 mg  2 tablets twice daily, Trazodone 50 mg 1-2 tablets at bedtime  No suicidal ideations  Patient has Hypothyroidism  She takes Levothyroxine 88 mcg daily  Last blood test done in April 2018  Patient did not go blood work as ordered in June  C/o migraine headaches associated with mild nausea, mild dizziness  She usually takes Imitrex with improvement in symptoms  Denies tobacco use      The following portions of the patient's history were reviewed and updated as appropriate: current medications, past family history, past medical history, past social history, past surgical history and problem list     Review of Systems   Constitutional: Positive for appetite change (decreased appetite) and fatigue  Negative for activity change and unexpected weight change  HENT: Negative for congestion, ear pain, nosebleeds, tinnitus and trouble swallowing  Eyes: Negative for pain, discharge, redness, itching and visual disturbance  Respiratory: Negative for cough, chest tightness, shortness of breath and wheezing  Cardiovascular: Negative for chest pain, palpitations (mild nausea) and leg swelling  Gastrointestinal: Positive for nausea  Negative for abdominal pain, blood in stool, constipation, diarrhea and vomiting  Genitourinary: Negative for difficulty urinating, dysuria, flank pain, frequency and hematuria  Musculoskeletal: Negative for arthralgias, back pain and myalgias  Skin: Negative for rash and wound  Neurological: Positive for dizziness (mild) and headaches  Negative for seizures and syncope  Hematological: Negative  Psychiatric/Behavioral:        See HPI         Objective:      /86 (BP Location: Left arm, Patient Position: Sitting, Cuff Size: Large)   Pulse 80   Temp 98 2 °F (36 8 °C) (Tympanic)   Resp 16   Wt 89 5 kg (197 lb 6 4 oz)   BMI 31 86 kg/m²        Physical Exam   Constitutional: She appears well-developed and well-nourished  No distress  HENT:   Head: Normocephalic and atraumatic  Right Ear: External ear normal    Left Ear: External ear normal    Mouth/Throat: Oropharynx is clear and moist    Eyes: Conjunctivae are normal  Pupils are equal, round, and reactive to light  Neck: Normal range of motion  Neck supple  No thyromegaly present  Cardiovascular: Normal rate, regular rhythm and normal heart sounds  No murmur heard  No BL LE edema   Pulmonary/Chest: Effort normal and breath sounds normal  She has no wheezes  She has no rales  Abdominal: Soft  Bowel sounds are normal  There is no tenderness  Musculoskeletal: Normal range of motion  She exhibits no edema, tenderness or deformity  Lymphadenopathy:     She has no cervical adenopathy  Skin: Skin is warm and dry  No rash noted  Psychiatric: Her behavior is normal    Patient feels depressed, tearful   Nursing note and vitals reviewed

## 2018-08-28 NOTE — TELEPHONE ENCOUNTER
I called [facility] for a Medical Certificate to be sent to us by fax for Dr TALLEY to fill out

## 2018-10-01 ENCOUNTER — OFFICE VISIT (OUTPATIENT)
Dept: FAMILY MEDICINE CLINIC | Facility: CLINIC | Age: 73
End: 2018-10-01
Payer: MEDICARE

## 2018-10-01 VITALS
DIASTOLIC BLOOD PRESSURE: 78 MMHG | SYSTOLIC BLOOD PRESSURE: 116 MMHG | WEIGHT: 198.8 LBS | BODY MASS INDEX: 32.09 KG/M2 | TEMPERATURE: 98.3 F | HEART RATE: 64 BPM | RESPIRATION RATE: 16 BRPM

## 2018-10-01 DIAGNOSIS — Z23 NEED FOR INFLUENZA VACCINATION: ICD-10-CM

## 2018-10-01 DIAGNOSIS — Z00.00 MEDICARE ANNUAL WELLNESS VISIT, INITIAL: Primary | ICD-10-CM

## 2018-10-01 DIAGNOSIS — E03.9 ACQUIRED HYPOTHYROIDISM: ICD-10-CM

## 2018-10-01 DIAGNOSIS — F41.8 DEPRESSION WITH ANXIETY: ICD-10-CM

## 2018-10-01 DIAGNOSIS — Z12.11 SCREENING FOR COLON CANCER: ICD-10-CM

## 2018-10-01 DIAGNOSIS — E78.2 MIXED HYPERLIPIDEMIA: ICD-10-CM

## 2018-10-01 PROCEDURE — 90662 IIV NO PRSV INCREASED AG IM: CPT

## 2018-10-01 PROCEDURE — G0438 PPPS, INITIAL VISIT: HCPCS | Performed by: FAMILY MEDICINE

## 2018-10-01 PROCEDURE — G0008 ADMIN INFLUENZA VIRUS VAC: HCPCS

## 2018-10-01 NOTE — PROGRESS NOTES
Chief Complaint   Patient presents with   [facility] Wellness Visit     Annual wellness visit  Assessment and Plan:    Problem List Items Addressed This Visit     None      Visit Diagnoses     Screening for colon cancer    -  Primary    Need for influenza vaccination            Health Maintenance Due   Topic Date Due    Medicare Annual Wellness Visit (AWV)  1945    CRC Screening: Colonoscopy  1945    INFLUENZA VACCINE  09/01/2018         HPI:  Jonnie Terrell is a 67 y o  female here for her Initial Wellness Visit  Patient was seen in the office on August 28, 2018 patient for follow-up visit  PMH: Hyperlipidemia, Hypothyroidism, Depression  Anxiety, Insomnia  Depression / Anxiety -  symptoms improved after dose of Zoloft was increased from 25 to 50 mg daily  No suicidal ideations  She continues taking  Effexor 75 mg 2 tablets twice daily, Trazodone 50 mg -2 tab  at bedtime  Patient Active Problem List   Diagnosis    Carpal tunnel syndrome of right wrist    Trigger ring finger of right hand    Hypothyroidism    Hyperlipidemia    Depression with anxiety    Chronic neck pain    Degenerative cervical spinal stenosis    Chronic insomnia    Obesity (BMI 30 0-34  9)    Right wrist fracture, sequela    Menopause    Migraine headache    Other fatigue     Past Medical History:   Diagnosis Date    Closed nondisplaced fracture of styloid process of right ulna 1/28/2018    Depression     Disease of thyroid gland     Hypertension      Past Surgical History:   Procedure Laterality Date    CHOLECYSTECTOMY      19 SEPT 2017 LAST ASSESSED    GASTRECTOMY      SLEEVE  19 SEPT 2017 LAST ASSESSED    GASTRECTOMY SLEEVE LAPAROSCOPIC      KNEE SURGERY      GA INCISE FINGER TENDON SHEATH Right 1/2/2018    Procedure: RING TRIGGER FINGER RELEASE;  Surgeon: Rina Ricardo MD;  [facility];  Service: Orthopedics    GA WRIST Deborra Levo LIG Right 1/2/2018 Procedure: RELEASE CARPAL TUNNEL ENDOSCOPIC;  Surgeon: Amaya Moran MD;  [facility];  Service: Orthopedics     Family History   Problem Relation Age of Onset    Cancer Mother         lung    Cancer Sister         lung esophagus    Cancer Maternal Grandfather     Alcohol abuse Son     Depression Daughter      History   Smoking Status    Never Smoker   Smokeless Tobacco    Never Used     History   Alcohol Use    Yes     Comment: moderately      History   Drug Use No       Current Outpatient Prescriptions   Medication Sig Dispense Refill    acyclovir (ZOVIRAX) 400 MG tablet TAKE 1 TABLET BY MOUTH ONCE A DAY 30 tablet 3    atorvastatin (LIPITOR) 20 mg tablet Take 1 tablet by mouth daily      Biotin 5000 MCG CAPS Take by mouth      cholecalciferol (VITAMIN D3) 1,000 units tablet Take 1 tablet by mouth daily      levothyroxine 88 mcg tablet Take 1 tablet by mouth daily      Multiple Vitamin (MULTI-VITAMIN DAILY PO) Take 1 tablet by mouth daily      sertraline (ZOLOFT) 50 mg tablet Take 1 tab daily 30 tablet 5    SUMAtriptan (IMITREX) 100 mg tablet Take 1 tablet by mouth      traMADol (ULTRAM) 50 mg tablet Take by mouth      traZODone (DESYREL) 50 mg tablet TAKE 2 TABLETS AT BEDTIME 60 tablet 3    venlafaxine (EFFEXOR) 75 mg tablet Take 2 tablets by mouth 2 (two) times a day      vitamin B-12 (CYANOCOBALAMIN) 500 MCG TABS Take 1 tablet by mouth daily       No current facility-administered medications for this visit        Allergies   Allergen Reactions    Codeine Nausea Only    Lisinopril     Paroxetine     Other Rash     Adhesive tape     Immunization History   Administered Date(s) Administered    Influenza Split High Dose Preservative Free IM 09/19/2017    Pneumococcal Conjugate 13-Valent 07/21/2015    Pneumococcal Polysaccharide PPV23 03/06/2014    Tdap 06/20/2014, 01/19/2018    Zoster 03/06/2014     MMSE: 30/30     Patient Care Team:  Franki Rivas MD as PCP - General    Medicare Screening Tests and Risk Assessments:  Luz Marina Ramirez is here for her Initial Wellness visit  Health Risk Assessment:  Patient rates overall health as very good  Patient feels that their physical health rating is Same  Eyesight was rated as Same  Hearing was rated as Same  Patient feels that their emotional and mental health rating is Slightly worse  Pain experienced by patient in the last 7 days has been None  Emotional/Mental Health:  Patient has been feeling nervous/anxious  PHQ-9 Depression Screening:    Frequency of the following problems over the past two weeks:      1  Little interest or pleasure in doing things: 2 - more than half the days      2  Feeling down, depressed, or hopeless: 2 - more than half the days  PHQ-2 Score: 4          Broken Bones/Falls: Fall Risk Assessment:    In the past year, patient has experienced: History of falling in past year     Number of falls: 1          Bladder/Bowel:  Patient has leaked urine accidently in the last six months  Patient reports no loss of bowel control  Immunizations:  Patient has had a flu vaccination within the last year  Home Safety:  Patient does not have trouble with stairs inside or outside of their home  Patient currently reports that there are no safety hazards present in home, working smoke alarms, working carbon monoxide detectors  Preventative Screenings:   Breast cancer screening performed, colon cancer screen completed, cholesterol screen completed, glaucoma eye exam completed,     Nutrition:  Current diet: Regular with servings of the following:    Medications:  Patient is currently taking over-the-counter supplements  Patient is able to manage medications  Lifestyle Choices:  Patient reports no tobacco use  Patient has not smoked or used tobacco in the past   Patient reports alcohol use  Alcohol use per week: 1 per week  Patient drives a vehicle  Patient wears seat belt      Current level of exercise of physical activity described by patient as: Everyday  Activities of Daily Living:  Can get out of bed by his or her self, able to dress self, able to make own meals, able to do own shopping, able to bathe self, can do own laundry/housekeeping, can manage own money, pay bills and track expenses    Previous Hospitalizations:  No hospitalization or ED visit in past 12 months        Advanced Directives:  Patient has decided on a power of   Patient has spoken to designated power of   Patient has completed advanced directive  Preventative Screening/Counseling:      Cardiovascular:      General: Risks and Benefits Discussed and Screening Current      Counseling: Healthy Diet, Healthy Weight, Improve Cholesterol, Improve Blood Pressure and Improve Exercise Tolerance          Diabetes:      General: Risks and Benefits Discussed and Screening Current      Counseling: Healthy Diet, Healthy Weight and Improve Physical Activity          Colorectal Cancer:      General: Risks and Benefits Discussed and Screening Current      Counseling: high fiber diet          Breast Cancer:      General: Risks and Benefits Discussed and Screening Current          Cervical Cancer:      General: Risks and Benefits Discussed and Screening Not Indicated          Osteoporosis:      General: Risks and Benefits Discussed and Screening Current      Counseling: Calcium and Vitamin D Intake and Regular Weightbearing Exercise          AAA:      General: Risks and Benefits Discussed and Screening Not Indicated          Glaucoma:      General: Risks and Benefits Discussed and Screening Current          HIV:      General: Screening Not Indicated          Advanced Directives:   Patient has living will for healthcare, does not have durable POA for healthcare, patient does not have an advanced directive  Information on ACP and/or AD provided  5 wishes given  End of life assessment reviewed with patient  Immunizations:      Influenza: Risks & Benefits Discussed and Influenza Due Today      Pneumococcal: Risks & Benefits Discussed and Lifetime Vaccine Completed      Shingrix: Risks & Benefits Discussed      Hepatitis B (Low risk patients): Series Not Indicated      Zostavax: Zostavax Vaccine UTD      TDAP: Risks & Benefits Discussed and Tdap Vaccine UTD      Other Preventative Counseling (Non-Medicare):   Fall Prevention, Increase physical activity, Nutrition Counseling, Car/seat belt/driving safety reviewed and Skin self-exam

## 2018-10-03 DIAGNOSIS — G43.009 MIGRAINE WITHOUT AURA AND WITHOUT STATUS MIGRAINOSUS, NOT INTRACTABLE: Primary | ICD-10-CM

## 2018-10-03 RX ORDER — SUMATRIPTAN 100 MG/1
TABLET, FILM COATED ORAL
Qty: 30 TABLET | Refills: 0 | Status: SHIPPED | OUTPATIENT
Start: 2018-10-03 | End: 2018-11-23 | Stop reason: SDUPTHER

## 2018-10-10 ENCOUNTER — HOSPITAL ENCOUNTER (OUTPATIENT)
Dept: MAMMOGRAPHY | Facility: CLINIC | Age: 73
Discharge: HOME | End: 2018-10-10
Payer: MEDICARE

## 2018-10-10 ENCOUNTER — HOSPITAL ENCOUNTER (OUTPATIENT)
Dept: ULTRASOUND IMAGING | Facility: CLINIC | Age: 73
Discharge: HOME | End: 2018-10-10
Payer: MEDICARE

## 2018-10-10 DIAGNOSIS — N63.0 BREAST NODULE: ICD-10-CM

## 2018-10-10 PROCEDURE — 76642 ULTRASOUND BREAST LIMITED: CPT

## 2018-10-10 PROCEDURE — 77066 DX MAMMO INCL CAD BI: CPT

## 2018-11-17 DIAGNOSIS — B00.9 HERPES INFECTION: ICD-10-CM

## 2018-11-19 RX ORDER — ACYCLOVIR 400 MG/1
400 TABLET ORAL DAILY
Qty: 30 TABLET | Refills: 5 | Status: SHIPPED | OUTPATIENT
Start: 2018-11-19 | End: 2019-04-08 | Stop reason: SDUPTHER

## 2018-11-19 NOTE — TELEPHONE ENCOUNTER
From: Krish Goodwin  Sent: 11/17/2018 11:10 AM EST  Subject: Medication Renewal Request    Krish Goodwin would like a refill of the following medications:     acyclovir (ZOVIRAX) 400 MG tablet Lila Alvarez MD]    Preferred pharmacy: [facility]/PHARMACY #[id number]

## 2018-11-23 DIAGNOSIS — F32.A ANXIETY AND DEPRESSION: Primary | ICD-10-CM

## 2018-11-23 DIAGNOSIS — G43.009 MIGRAINE WITHOUT AURA AND WITHOUT STATUS MIGRAINOSUS, NOT INTRACTABLE: ICD-10-CM

## 2018-11-23 DIAGNOSIS — E03.9 HYPOTHYROIDISM, UNSPECIFIED TYPE: ICD-10-CM

## 2018-11-23 DIAGNOSIS — F41.8 DEPRESSION WITH ANXIETY: ICD-10-CM

## 2018-11-23 DIAGNOSIS — F41.9 ANXIETY AND DEPRESSION: Primary | ICD-10-CM

## 2018-11-23 DIAGNOSIS — E78.5 HYPERLIPIDEMIA, UNSPECIFIED HYPERLIPIDEMIA TYPE: ICD-10-CM

## 2018-11-23 RX ORDER — ATORVASTATIN CALCIUM 20 MG/1
20 TABLET, FILM COATED ORAL DAILY
Qty: 30 TABLET | Refills: 3 | Status: SHIPPED | OUTPATIENT
Start: 2018-11-23 | End: 2018-11-25 | Stop reason: SDUPTHER

## 2018-11-23 RX ORDER — SUMATRIPTAN 100 MG/1
100 TABLET, FILM COATED ORAL ONCE AS NEEDED
Qty: 9 TABLET | Refills: 3 | Status: SHIPPED | OUTPATIENT
Start: 2018-11-23 | End: 2018-11-25 | Stop reason: SDUPTHER

## 2018-11-23 RX ORDER — LEVOTHYROXINE SODIUM 88 UG/1
88 TABLET ORAL DAILY
Qty: 90 TABLET | Refills: 3 | Status: SHIPPED | OUTPATIENT
Start: 2018-11-23 | End: 2018-11-25 | Stop reason: SDUPTHER

## 2018-11-23 RX ORDER — VENLAFAXINE 75 MG/1
150 TABLET ORAL 2 TIMES DAILY
Qty: 120 TABLET | Refills: 3 | Status: SHIPPED | OUTPATIENT
Start: 2018-11-23 | End: 2018-11-25 | Stop reason: SDUPTHER

## 2018-11-23 NOTE — TELEPHONE ENCOUNTER
Patient phoned requesting refills of the following medications to Ami Golden MD:  1  Atorvastatin (LIPITOR) 20 mg, take 1 tablet daily, 30-day supply w/refills  2  Levothyroxine 88 mcg tablet, take 1 tablet daily, 30-day refill w/refills  3  Venlafaxine (EFFEXOR) 75 mg tablet, take 2 tablets 2x a day, 30-day supply w/refills  Patient can be contacted at [phone] with any questions

## 2018-11-25 DIAGNOSIS — G43.009 MIGRAINE WITHOUT AURA AND WITHOUT STATUS MIGRAINOSUS, NOT INTRACTABLE: ICD-10-CM

## 2018-11-25 DIAGNOSIS — E03.9 HYPOTHYROIDISM, UNSPECIFIED TYPE: ICD-10-CM

## 2018-11-25 DIAGNOSIS — F51.04 CHRONIC INSOMNIA: ICD-10-CM

## 2018-11-25 DIAGNOSIS — E78.5 HYPERLIPIDEMIA, UNSPECIFIED HYPERLIPIDEMIA TYPE: ICD-10-CM

## 2018-11-25 DIAGNOSIS — F41.8 DEPRESSION WITH ANXIETY: ICD-10-CM

## 2018-11-26 RX ORDER — LEVOTHYROXINE SODIUM 88 UG/1
88 TABLET ORAL DAILY
Qty: 90 TABLET | Refills: 3 | Status: SHIPPED | OUTPATIENT
Start: 2018-11-26 | End: 2019-02-24

## 2018-11-26 RX ORDER — TRAZODONE HYDROCHLORIDE 50 MG/1
100 TABLET ORAL
Qty: 60 TABLET | Refills: 3 | Status: SHIPPED | OUTPATIENT
Start: 2018-11-26 | End: 2019-03-25 | Stop reason: SDUPTHER

## 2018-11-26 RX ORDER — ATORVASTATIN CALCIUM 20 MG/1
20 TABLET, FILM COATED ORAL DAILY
Qty: 30 TABLET | Refills: 3 | Status: SHIPPED | OUTPATIENT
Start: 2018-11-26 | End: 2019-05-20 | Stop reason: SDUPTHER

## 2018-11-26 RX ORDER — VENLAFAXINE 75 MG/1
150 TABLET ORAL 2 TIMES DAILY
Qty: 120 TABLET | Refills: 3 | Status: SHIPPED | OUTPATIENT
Start: 2018-11-26 | End: 2019-03-25 | Stop reason: SDUPTHER

## 2018-11-26 RX ORDER — SUMATRIPTAN 100 MG/1
100 TABLET, FILM COATED ORAL ONCE AS NEEDED
Qty: 9 TABLET | Refills: 3 | Status: SHIPPED | OUTPATIENT
Start: 2018-11-26 | End: 2019-05-02 | Stop reason: SDUPTHER

## 2018-11-26 NOTE — TELEPHONE ENCOUNTER
From: Irena Thomas  Sent: 11/25/2018 9:27 PM EST  Subject: Medication Renewal Request    Irena Thomas would like a refill of the following medications:     traZODone (DESYREL) 50 mg tablet Tyrel Atkins MD]     sertraline (ZOLOFT) 50 mg tablet Tyrel Atkins MD]     SUMAtriptan (IMITREX) 100 mg tablet Tyrel Atkins MD]     venlafaxine (EFFEXOR) 75 mg tablet Tyrel Atkins MD]     atorvastatin (LIPITOR) 20 mg tablet Tyrel Atkins MD]     levothyroxine 88 mcg tablet Tyrel Atkins MD]    Preferred pharmacy: [facility]/PHARMACY #[id number]

## 2019-03-25 DIAGNOSIS — F41.8 DEPRESSION WITH ANXIETY: ICD-10-CM

## 2019-03-25 DIAGNOSIS — F51.04 CHRONIC INSOMNIA: ICD-10-CM

## 2019-03-25 RX ORDER — VENLAFAXINE 75 MG/1
150 TABLET ORAL 2 TIMES DAILY
Qty: 120 TABLET | Refills: 3 | Status: SHIPPED | OUTPATIENT
Start: 2019-03-25 | End: 2019-07-12 | Stop reason: SDUPTHER

## 2019-03-25 RX ORDER — TRAZODONE HYDROCHLORIDE 50 MG/1
100 TABLET ORAL
Qty: 180 TABLET | Refills: 3 | Status: SHIPPED | OUTPATIENT
Start: 2019-03-25 | End: 2019-06-23

## 2019-03-25 NOTE — TELEPHONE ENCOUNTER
Patient wants refills on trazodone 50 mg and Venlafaxine 75 mg sent to [facility] AGNIESZKA Rosales can be reached at [phone] any question  She has moved to [place] but comes up here for doctor's appointments  Said she will be looking for a new doctor in the future

## 2019-04-08 DIAGNOSIS — B00.9 HERPES INFECTION: ICD-10-CM

## 2019-04-08 RX ORDER — ACYCLOVIR 400 MG/1
400 TABLET ORAL DAILY
Qty: 30 TABLET | Refills: 5 | Status: SHIPPED | OUTPATIENT
Start: 2019-04-08 | End: 2019-10-06 | Stop reason: SDUPTHER

## 2019-05-02 DIAGNOSIS — G43.009 MIGRAINE WITHOUT AURA AND WITHOUT STATUS MIGRAINOSUS, NOT INTRACTABLE: ICD-10-CM

## 2019-05-02 RX ORDER — SUMATRIPTAN 100 MG/1
100 TABLET, FILM COATED ORAL ONCE AS NEEDED
Qty: 9 TABLET | Refills: 1 | Status: SHIPPED | OUTPATIENT
Start: 2019-05-02 | End: 2019-08-07 | Stop reason: SDUPTHER

## 2019-05-10 DIAGNOSIS — F41.8 DEPRESSION WITH ANXIETY: ICD-10-CM

## 2019-05-20 DIAGNOSIS — E78.5 HYPERLIPIDEMIA, UNSPECIFIED HYPERLIPIDEMIA TYPE: ICD-10-CM

## 2019-05-20 RX ORDER — ATORVASTATIN CALCIUM 20 MG/1
TABLET, FILM COATED ORAL
Qty: 90 TABLET | Refills: 3 | Status: SHIPPED | OUTPATIENT
Start: 2019-05-20

## 2019-05-20 RX ORDER — ATORVASTATIN CALCIUM 20 MG/1
20 TABLET, FILM COATED ORAL DAILY
Qty: 30 TABLET | Refills: 3 | Status: SHIPPED | OUTPATIENT
Start: 2019-05-20 | End: 2019-05-20 | Stop reason: SDUPTHER

## 2019-07-12 DIAGNOSIS — F41.8 DEPRESSION WITH ANXIETY: ICD-10-CM

## 2019-07-12 RX ORDER — VENLAFAXINE 75 MG/1
TABLET ORAL
Qty: 120 TABLET | Refills: 3 | Status: SHIPPED | OUTPATIENT
Start: 2019-07-12

## 2019-07-12 RX ORDER — VENLAFAXINE 75 MG/1
TABLET ORAL
Qty: 360 TABLET | Refills: 0 | Status: SHIPPED | OUTPATIENT
Start: 2019-07-12

## 2019-08-07 DIAGNOSIS — G43.009 MIGRAINE WITHOUT AURA AND WITHOUT STATUS MIGRAINOSUS, NOT INTRACTABLE: ICD-10-CM

## 2019-08-07 RX ORDER — SUMATRIPTAN 100 MG/1
TABLET, FILM COATED ORAL
Qty: 9 TABLET | Refills: 0 | Status: SHIPPED | OUTPATIENT
Start: 2019-08-07 | End: 2019-09-10 | Stop reason: SDUPTHER

## 2019-08-10 DIAGNOSIS — F41.8 DEPRESSION WITH ANXIETY: ICD-10-CM

## 2019-08-10 RX ORDER — VENLAFAXINE 75 MG/1
TABLET ORAL
Qty: 120 TABLET | Refills: 5 | Status: SHIPPED | OUTPATIENT
Start: 2019-08-10 | End: 2020-02-19

## 2019-09-10 DIAGNOSIS — G43.009 MIGRAINE WITHOUT AURA AND WITHOUT STATUS MIGRAINOSUS, NOT INTRACTABLE: ICD-10-CM

## 2019-09-10 RX ORDER — SUMATRIPTAN 100 MG/1
100 TABLET, FILM COATED ORAL ONCE AS NEEDED
Qty: 91 TABLET | Refills: 1 | Status: SHIPPED | OUTPATIENT
Start: 2019-09-10 | End: 2019-10-10

## 2019-09-27 NOTE — PROGRESS NOTES
Assessment/Plan: 1  Hypothyroidism -  continue Levothyroxine 88 mcg daily  Check TSH level      2  Hyperlipidemia - continue Atorvastatin 20 mg daily  Follow a  low-cholesterol, low-fat diet, regular exercise  Check CMP, lipid panel  3 Depression / Anxiety/ Insomnia - symptoms are stable  Continue Effexor 75 mg 2 tablets twice daily, Trazodone 50 mg 2 tablets at bedtime      4  Chronic neck pain secondary to cervical spinal stenosis - take Tramadol 50 mg one tablet twice daily PRN for pain      5   - recommended to schedule DEXA scan, screening mammogram     Schedule follow-up visit in 6 months  Diagnoses and all orders for this visit:    Acquired hypothyroidism    Mixed hyperlipidemia    Depression with anxiety    Chronic insomnia    Chronic neck pain    Degenerative cervical spinal stenosis        Subjective:      Patient ID: Sadie Ch is a 67 y o  female  HPI     Patient is 67 y  o female with Hypothyroidism, Hyperlipidemia, Depression, Anxiety, Insomnia, chronic neck pain due to cervical spinal stenosis, obesity, S/P  sleeve gastrectomy in 2013  She presents for 6 month follow-up visit  Reviewed all current medications, updated meds list      Patient did not go for blood work as ordered in 9/17  Hypothyroidism - currently on Levothyroxine 88 mcg daily  Denies fatigue  No chest pain, shortness of breath, dizziness  Depression /Anxiety - symptoms are stable on Effexor  Patient takes Trazodone 50 mg 2 tablets at bedtime for insomnia  Patient had R carpal tunnel release in 1/18 performed by Dr Sarah Nunn  She fell and  broke R wrist  In January  Currently undergoing physical therapy  She did not schedule screening mammogram as ordered in 9/17  Colonoscopy done in [facility] ago        The following portions of the patient's history were reviewed and updated as appropriate: allergies, current medications, past family history, past medical history, past social history, past surgical history and problem list     Review of Systems   Constitutional: Negative for activity change, appetite change, chills, fatigue and fever  HENT: Negative  Eyes: Negative for pain, discharge, redness, itching and visual disturbance  Respiratory: Negative for cough, chest tightness, shortness of breath and wheezing  Cardiovascular: Negative for chest pain, palpitations and leg swelling  Gastrointestinal: Negative for abdominal pain, anal bleeding, blood in stool, constipation, diarrhea, nausea and vomiting  Endocrine: Negative for cold intolerance and heat intolerance  Genitourinary: Negative for difficulty urinating, dysuria, frequency and hematuria  Musculoskeletal: Positive for arthralgias (R wrist) and neck pain (chronic neck pain )  Negative for gait problem  Skin: Negative for rash and wound  Neurological: Negative for dizziness, syncope, weakness, numbness and headaches  Hematological: Negative  Psychiatric/Behavioral: Positive for sleep disturbance  Negative for suicidal ideas  Depression  Anxiety - symptoms are stable  Objective:      /82 (BP Location: Left arm, Patient Position: Sitting, Cuff Size: Adult)   Pulse 66   Temp (!) 97 4 °F (36 3 °C) (Tympanic)   Resp 16   Ht 5' 6 5" (1 689 m)   Wt 86 6 kg (191 lb)   SpO2 97%   BMI 30 37 kg/m²        Physical Exam   Constitutional: She appears well-developed and well-nourished  No distress  Mildly obese   HENT:   Head: Normocephalic and atraumatic  Right Ear: External ear normal    Left Ear: External ear normal    Mouth/Throat: Oropharynx is clear and moist    Eyes: Conjunctivae are normal  Pupils are equal, round, and reactive to light  Cardiovascular: Normal rate, regular rhythm and normal heart sounds  No murmur heard  No carotid bruits BL  No BL LE edema   Pulmonary/Chest: Effort normal and breath sounds normal  She has no wheezes  She has no rales  Abdominal: Soft  Bowel sounds are normal  There is no tenderness  Musculoskeletal: Normal range of motion  She exhibits no edema or tenderness  Skin: Skin is warm and dry  No rash noted  Psychiatric: She has a normal mood and affect  Her behavior is normal  Judgment and thought content normal    Nursing note and vitals reviewed  details… detailed exam

## 2019-10-06 DIAGNOSIS — B00.9 HERPES INFECTION: ICD-10-CM

## 2019-10-06 RX ORDER — ACYCLOVIR 400 MG/1
TABLET ORAL
Qty: 30 TABLET | Refills: 5 | Status: SHIPPED | OUTPATIENT
Start: 2019-10-06 | End: 2019-11-05

## 2020-02-19 DIAGNOSIS — F41.8 DEPRESSION WITH ANXIETY: ICD-10-CM

## 2020-02-19 RX ORDER — VENLAFAXINE 75 MG/1
TABLET ORAL
Qty: 120 TABLET | Refills: 3 | Status: SHIPPED | OUTPATIENT
Start: 2020-02-19

## 2020-02-19 NOTE — TELEPHONE ENCOUNTER
I called patient, she actually moved out of [facility] to [place], and is under a new physician's care there  Calling the pharmacy to cancel our order

## 2020-04-05 DIAGNOSIS — B00.9 HERPES INFECTION: ICD-10-CM

## 2020-04-06 RX ORDER — ACYCLOVIR 400 MG/1
TABLET ORAL
Qty: 30 TABLET | Refills: 5 | OUTPATIENT
Start: 2020-04-06 | End: 2020-05-06

## 2020-05-14 DIAGNOSIS — E78.5 HYPERLIPIDEMIA, UNSPECIFIED HYPERLIPIDEMIA TYPE: ICD-10-CM

## 2020-05-14 RX ORDER — ATORVASTATIN CALCIUM 20 MG/1
TABLET, FILM COATED ORAL
Qty: 90 TABLET | Refills: 3 | OUTPATIENT
Start: 2020-05-14

## 2020-06-28 DIAGNOSIS — F41.8 DEPRESSION WITH ANXIETY: ICD-10-CM

## 2020-06-29 RX ORDER — VENLAFAXINE 75 MG/1
TABLET ORAL
Qty: 120 TABLET | Refills: 3 | OUTPATIENT
Start: 2020-06-29

## 2020-11-27 DIAGNOSIS — G43.009 MIGRAINE WITHOUT AURA AND WITHOUT STATUS MIGRAINOSUS, NOT INTRACTABLE: ICD-10-CM

## 2020-11-27 RX ORDER — SUMATRIPTAN 100 MG/1
TABLET, FILM COATED ORAL
Qty: 91 TABLET | Refills: 1 | OUTPATIENT
Start: 2020-11-27

## 2025-03-25 NOTE — ASSESSMENT & PLAN NOTE
Check CBC with dif, TSH  Patient has order for blood work given in 6/18 
Continue Levothyroxine 88 mcg daily 
Recommended to take Trazodone 50 mg -2 tablets at bedtime 
Will increase dose of Zoloft from 25 mg to 50 mg daily  Continue Effexor 75 mg  2 tablets twice daily  Patient declined to start psychotherapy  She wants to establish a psychotherapist when moves to [place]  Advised to call office with update on symptoms in 2 weeks 
Render In Strict Bullet Format?: No
Detail Level: Zone
Initiate Treatment: RX Fluorouracil 5% cream BID X 2 weeks to face\\nPt advised to treat spot on left chest as well

## (undated) DEVICE — SUT MONOCRYL 4-0 PS-2 18 IN Y496G

## (undated) DEVICE — GAUZE SPONGES,16 PLY: Brand: CURITY

## (undated) DEVICE — GLOVE SRG BIOGEL 7.5

## (undated) DEVICE — STRL COTTON TIP APPLCTR 6IN PK: Brand: CARDINAL HEALTH

## (undated) DEVICE — PADDING CAST 4 IN  COTTON STRL

## (undated) DEVICE — OCCLUSIVE GAUZE STRIP,3% BISMUTH TRIBROMOPHENATE IN PETROLATUM BLEND: Brand: XEROFORM

## (undated) DEVICE — CUFF TOURNIQUET 18 X 4 IN QUICK CONNECT DISP 1 BLADDER

## (undated) DEVICE — NEEDLE 25G X 1 1/2

## (undated) DEVICE — INTENDED FOR TISSUE SEPARATION, AND OTHER PROCEDURES THAT REQUIRE A SHARP SURGICAL BLADE TO PUNCTURE OR CUT.: Brand: BARD-PARKER SAFETY BLADES SIZE 15, STERILE

## (undated) DEVICE — DISPOSABLE EQUIPMENT COVER: Brand: SMALL TOWEL DRAPE

## (undated) DEVICE — ADHESIVE SKN CLSR HISTOACRYL FLEX 0.5ML LF

## (undated) DEVICE — SPONGE PVP SCRUB WING STERILE

## (undated) DEVICE — GLOVE INDICATOR PI UNDERGLOVE SZ 8 BLUE

## (undated) DEVICE — PACK PLASTIC HAND PBDS

## (undated) DEVICE — CHLORAPREP HI-LITE 26ML ORANGE

## (undated) DEVICE — ACE WRAP 3 IN STERILE

## (undated) DEVICE — RETROGRADE KNIFE BOX OF 6: Brand: ECTRA

## (undated) DEVICE — SUT VICRYL 3-0 SH 27 IN J416H